# Patient Record
Sex: FEMALE | Race: WHITE | NOT HISPANIC OR LATINO | Employment: FULL TIME | URBAN - NONMETROPOLITAN AREA
[De-identification: names, ages, dates, MRNs, and addresses within clinical notes are randomized per-mention and may not be internally consistent; named-entity substitution may affect disease eponyms.]

---

## 2017-06-15 ENCOUNTER — OFFICE VISIT (OUTPATIENT)
Dept: VASCULAR SURGERY | Facility: CLINIC | Age: 67
End: 2017-06-15

## 2017-06-15 VITALS
OXYGEN SATURATION: 99 % | BODY MASS INDEX: 25.27 KG/M2 | HEART RATE: 75 BPM | SYSTOLIC BLOOD PRESSURE: 120 MMHG | WEIGHT: 161 LBS | DIASTOLIC BLOOD PRESSURE: 82 MMHG | HEIGHT: 67 IN

## 2017-06-15 DIAGNOSIS — M79.89 LEG SWELLING: ICD-10-CM

## 2017-06-15 DIAGNOSIS — I87.2 VENOUS (PERIPHERAL) INSUFFICIENCY: Primary | ICD-10-CM

## 2017-06-15 PROBLEM — I80.292: Status: ACTIVE | Noted: 2017-05-18

## 2017-06-15 PROCEDURE — 99203 OFFICE O/P NEW LOW 30 MIN: CPT | Performed by: NURSE PRACTITIONER

## 2017-06-15 RX ORDER — MULTIVIT WITH MINERALS/LUTEIN
1000 TABLET ORAL DAILY
COMMUNITY

## 2017-06-15 NOTE — PROGRESS NOTES
06/15/2017      JALYN Najera  [address] [facility] [place] [address]    Mesha Jacques  1950    Chief Complaint   Patient presents with   • Varicose Veins     Patient had Left lower extremity venous doppler done on 05/04/17 at [facility]       Dear JALYN Najera:      HPI  I had the pleasure of seeing your patient Mesha Jacques in the office today.  Thank you kindly for this consultation.  As you recall, Mesha Jacques is a 66 y.o.  female who you are currently following for routine health maintenance.  She states she does have swelling and heaviness to her legs.  She denies any claudicating symptoms to her lower extremities.  She denies any previous history of DVT.  She does have a history of varicose veins with recent thrombosed varicosity of her left lower extremity.      Past Medical History:   Diagnosis Date   • Breast cancer 1997    Dr Santos   • Thrombophlebitis of left lower extremity 05/18/2017   • Varicose vein of leg     Left anterior tibial area and in the upper shin area       Past Surgical History:   Procedure Laterality Date   • CHOLECYSTECTOMY     • LYMPH NODE DISSECTION Left 2006   • MASTECTOMY Left 2006       History reviewed. No pertinent family history.    Social History     Social History   • Marital status: Unknown     Spouse name: N/A   • Number of children: N/A   • Years of education: N/A     Occupational History   • Not on file.     Social History Main Topics   • Smoking status: Current Some Day Smoker     Years: 50.00     Types: Cigarettes   • Smokeless tobacco: Never Used   • Alcohol use No   • Drug use: Defer   • Sexual activity: Defer     Other Topics Concern   • Not on file     Social History Narrative       No Known Allergies    Prior to Admission medications    Medication Sig Start Date End Date Taking? Authorizing Provider   ascorbic acid (VITAMIN C) 1000 MG tablet Take 1,000 mg by mouth Daily.   Yes Historical Provider, MD   aspirin 81 MG EC tablet Take 81 mg by  "mouth Daily.   Yes Historical Provider, MD   Multiple Vitamins-Minerals (CENTRUM SILVER 50+WOMEN PO) Take  by mouth.   Yes Historical Provider, MD   vitamin E 1000 UNIT capsule Take 1,000 Units by mouth Daily.   Yes Historical Provider, MD   omeprazole (priLOSEC) 40 MG capsule Take 40 mg by mouth Daily.    Historical Provider, MD       Review of Systems   Constitutional: Negative.    HENT: Negative.    Eyes: Negative.    Respiratory: Negative.    Cardiovascular: Positive for leg swelling.   Gastrointestinal: Negative.    Endocrine: Negative.    Genitourinary: Negative.    Musculoskeletal: Negative.    Skin: Negative.    Allergic/Immunologic: Negative.    Neurological: Negative.    Hematological: Negative.    Psychiatric/Behavioral: Negative.        /82  Pulse 75  Ht 67\" (170.2 cm)  Wt 161 lb (73 kg)  SpO2 99%  BMI 25.22 kg/m2  Physical Exam   Constitutional: She is oriented to person, place, and time. She appears well-developed and well-nourished. No distress.   HENT:   Head: Normocephalic and atraumatic.   Mouth/Throat: Oropharynx is clear and moist.   Eyes: Pupils are equal, round, and reactive to light. No scleral icterus.   Neck: Normal range of motion. Neck supple. No JVD present. Carotid bruit is not present. No thyromegaly present.   Cardiovascular: Normal rate, regular rhythm, S2 normal, normal heart sounds, intact distal pulses and normal pulses.  Exam reveals no gallop and no friction rub.    No murmur heard.  Varicose veins to bilateral lower extremities   Pulmonary/Chest: Effort normal and breath sounds normal.   Abdominal: Soft. Normal aorta and bowel sounds are normal. There is no hepatosplenomegaly.   Musculoskeletal: Normal range of motion.   Neurological: She is alert and oriented to person, place, and time. No cranial nerve deficit.   Skin: Skin is warm and dry. She is not diaphoretic.   Psychiatric: She has a normal mood and affect. Her behavior is normal. Judgment and thought content " normal.   Nursing note and vitals reviewed.    Diagnostic Data:  Outside testing showed a thrombosed varicosity of left lower extremity vein    Patient Active Problem List   Diagnosis   • Varicose vein of leg   • Thrombophlebitis of left lower extremity         ICD-10-CM ICD-9-CM   1. Venous (peripheral) insufficiency I87.2 459.81   2. Leg swelling M79.89 729.81       Plan: After thoroughly evaluating Mesha Jacques, I believe the best course of action is to initially remain conservative from a vascular standpoint.  We will have her wear compression stockings over the next 3 months in the 20-30 mm pressure gradient range.  We will see her back in 3 months with a venous insufficiency study.  I did  extensively on smoking cessation, and the patient was advised of the continued risks of smoking.  I provided over 10 minutes counseling on this matter.  The patient can continue taking her current medication regimen as previously planned.  This was all discussed in full with complete understanding.    Thank you for allowing me to participate in the care of your patient.  Please do not hesitate with any questions or concerns.  I will keep you aware of any further encounters with Mesha Jacques.        Sincerely yours,         JALYN Marvin MD

## 2017-06-21 ENCOUNTER — HOSPITAL ENCOUNTER (OUTPATIENT)
Dept: GENERAL RADIOLOGY | Age: 67
Discharge: HOME | End: 2017-06-21
Payer: COMMERCIAL

## 2017-06-21 DIAGNOSIS — F17.200 TOBACCO USE DISORDER: ICD-10-CM

## 2017-06-21 DIAGNOSIS — J44.9 CHRONIC OBSTRUCTIVE PULMONARY DISEASE, UNSPECIFIED COPD TYPE (HCC): ICD-10-CM

## 2017-06-21 PROCEDURE — 71020 XR CHEST STANDARD TWO VW: CPT

## 2017-09-15 ENCOUNTER — HOSPITAL ENCOUNTER (OUTPATIENT)
Dept: ULTRASOUND IMAGING | Facility: HOSPITAL | Age: 67
Discharge: HOME | End: 2017-09-15
Admitting: NURSE PRACTITIONER

## 2017-09-15 ENCOUNTER — OFFICE VISIT (OUTPATIENT)
Dept: VASCULAR SURGERY | Facility: CLINIC | Age: 67
End: 2017-09-15

## 2017-09-15 VITALS
DIASTOLIC BLOOD PRESSURE: 74 MMHG | HEART RATE: 72 BPM | HEIGHT: 67 IN | SYSTOLIC BLOOD PRESSURE: 130 MMHG | BODY MASS INDEX: 24.01 KG/M2 | WEIGHT: 153 LBS

## 2017-09-15 DIAGNOSIS — I87.2 VENOUS (PERIPHERAL) INSUFFICIENCY: ICD-10-CM

## 2017-09-15 DIAGNOSIS — I87.2 VENOUS INSUFFICIENCY: ICD-10-CM

## 2017-09-15 DIAGNOSIS — M79.89 LEG SWELLING: ICD-10-CM

## 2017-09-15 DIAGNOSIS — I83.90 VARICOSE VEIN OF LEG: Primary | ICD-10-CM

## 2017-09-15 PROCEDURE — 93970 EXTREMITY STUDY: CPT | Performed by: SURGERY

## 2017-09-15 PROCEDURE — 93970 EXTREMITY STUDY: CPT

## 2017-09-15 PROCEDURE — 99213 OFFICE O/P EST LOW 20 MIN: CPT | Performed by: NURSE PRACTITIONER

## 2017-09-15 RX ORDER — BUPROPION HYDROCHLORIDE 150 MG/1
TABLET ORAL
Refills: 3 | COMMUNITY
Start: 2017-08-02

## 2017-09-15 NOTE — PROGRESS NOTES
"09/15/2017      Naomi Jo, JALYN  [address] [facility] [address]        Mesha Jacques  1950    Chief Complaint   Patient presents with   • Follow-up     Leg venous insufficiency study results.       Dear JALYN Cornell:    HPI     I had the pleasure of seeing you patient in the office today for follow up.  As you recall, the patient is a 67 y.o. female who has  swelling and heaviness to her legs.  She denies any claudicating symptoms to her lower extremities.  She denies any previous history of DVT.  She does have a history of varicose veins with recent thrombosed varicosity of her left lower extremity.  She has been wearing compression stockings, which have been helping with her symptoms immensely.  She did have noninvasive testing performed today, which I did review in office.      /74  Pulse 72  Ht 67\" (170.2 cm)  Wt 153 lb (69.4 kg)  BMI 23.96 kg/m2  Physical Exam  Constitutional: She is oriented to person, place, and time. She appears well-developed and well-nourished. No distress.   HENT:   Head: Normocephalic and atraumatic.   Mouth/Throat: Oropharynx is clear and moist.   Eyes: Pupils are equal, round, and reactive to light. No scleral icterus.   Neck: Normal range of motion. Neck supple. No JVD present. Carotid bruit is not present. No thyromegaly present.   Cardiovascular: Normal rate, regular rhythm, S2 normal, normal heart sounds, intact distal pulses and normal pulses.  Exam reveals no gallop and no friction rub.    No murmur heard.  Varicose veins to bilateral lower extremities   Pulmonary/Chest: Effort normal and breath sounds normal.   Abdominal: Soft. Normal aorta and bowel sounds are normal. There is no hepatosplenomegaly.   Musculoskeletal: Normal range of motion.   Neurological: She is alert and oriented to person, place, and time. No cranial nerve deficit.   Skin: Skin is warm and dry. She is not diaphoretic.   Psychiatric: She has a normal mood and affect. Her " behavior is normal. Judgment and thought content normal.   Nursing note and vitals reviewed.    DIAGNOSTIC DATA: Noninvasive testing including a venous valvular insufficiency study shows greater than 0.5 seconds of reflux to the right leg in all areas except mid thigh with greater than 0.5 seconds of lesser saphenous vein us reflux.  In the left lower extremity she does have significant venous reflux noted in the SF J mid thigh and above the knee with greater than 0.9 seconds of reflux in the lesser saphenous vein mid calf only.      Patient Active Problem List   Diagnosis   • Varicose vein of leg   • Thrombophlebitis of left lower extremity         ICD-10-CM ICD-9-CM   1. Varicose vein of leg I83.90 454.9   2. Venous insufficiency I87.2 459.81   3. Leg swelling M79.89 729.81         PLAN: After thoroughly evaluating Mesha Jacques, I believe the best course of action is to Remain conservative from vascular standpoint.  Currently she has been wearing the stockings and states this is helping with her swelling and heaviness and tiredness of present extremities.  I did offer radiofrequency ablation and she would like to continue wearing the stockings at this time.  We will see her back in 1 year for continued surveillance.  The patient is to continue taking their medications as previously discussed.   This was all discussed in full with complete understanding.  Thank you for allowing me to participate in the care of your patient.  Please do not hesitate to call with any questions or concerns.  We will keep you aware of any further encounters with Mesha Jacques.      Sincerely Yours,      JALYN Marvin

## 2018-09-17 ENCOUNTER — TELEPHONE (OUTPATIENT)
Dept: VASCULAR SURGERY | Facility: CLINIC | Age: 68
End: 2018-09-17

## 2018-09-17 NOTE — TELEPHONE ENCOUNTER
Left message reminding Mrs Jacques of her appointment for Tuesday, September 18th, 2018 at 930 am with Priscilla GUNDERSON. Also advised if she had any questions or needed to reschedule to please call the office at [phone].

## 2019-07-16 VITALS
DIASTOLIC BLOOD PRESSURE: 66 MMHG | HEART RATE: 74 BPM | SYSTOLIC BLOOD PRESSURE: 122 MMHG | HEIGHT: 67 IN | WEIGHT: 144 LBS | BODY MASS INDEX: 22.6 KG/M2

## 2019-07-18 VITALS
OXYGEN SATURATION: 95 % | HEART RATE: 74 BPM | SYSTOLIC BLOOD PRESSURE: 122 MMHG | DIASTOLIC BLOOD PRESSURE: 66 MMHG | HEIGHT: 67 IN | BODY MASS INDEX: 22.6 KG/M2 | WEIGHT: 144 LBS

## 2019-07-18 DIAGNOSIS — N60.12 FIBROCYSTIC DISEASE OF LEFT BREAST: ICD-10-CM

## 2019-07-18 DIAGNOSIS — J44.9 CHRONIC OBSTRUCTIVE PULMONARY DISEASE, UNSPECIFIED COPD TYPE (HCC): ICD-10-CM

## 2019-07-18 DIAGNOSIS — Z85.3 PERSONAL HISTORY OF MALIGNANT PHYLLOIDES TUMOR OF BREAST: ICD-10-CM

## 2019-07-18 PROBLEM — N60.19 FIBROCYSTIC DISEASE OF BREAST: Status: ACTIVE | Noted: 2019-07-18

## 2019-07-18 SDOH — HEALTH STABILITY: MENTAL HEALTH: HOW OFTEN DO YOU HAVE A DRINK CONTAINING ALCOHOL?: NEVER

## 2019-08-07 ENCOUNTER — HOSPITAL ENCOUNTER (OUTPATIENT)
Dept: INFUSION THERAPY | Age: 69
Discharge: HOME | End: 2019-08-07
Payer: COMMERCIAL

## 2019-08-07 DIAGNOSIS — Z85.3 PERSONAL HISTORY OF MALIGNANT PHYLLOIDES TUMOR OF BREAST: Primary | ICD-10-CM

## 2019-08-07 DIAGNOSIS — N60.12 FIBROCYSTIC DISEASE OF LEFT BREAST: ICD-10-CM

## 2019-08-07 DIAGNOSIS — Z85.3 PERSONAL HISTORY OF MALIGNANT PHYLLOIDES TUMOR OF BREAST: ICD-10-CM

## 2019-08-07 DIAGNOSIS — J44.9 CHRONIC OBSTRUCTIVE PULMONARY DISEASE, UNSPECIFIED COPD TYPE (HCC): ICD-10-CM

## 2019-08-07 PROCEDURE — 36415 COLL VENOUS BLD VENIPUNCTURE: CPT

## 2019-08-07 PROCEDURE — 82378 CARCINOEMBRYONIC ANTIGEN: CPT

## 2019-08-07 PROCEDURE — 80053 COMPREHEN METABOLIC PANEL: CPT

## 2019-08-07 PROCEDURE — 86300 IMMUNOASSAY TUMOR CA 15-3: CPT

## 2019-08-07 PROCEDURE — 85025 COMPLETE CBC W/AUTO DIFF WBC: CPT

## 2019-08-27 NOTE — PROGRESS NOTES
radiation therapy to the left breast consisted of 5,040cGy followed by a 1,000cGy boost for a total of 6,040cGy completed on 07   6. Adjuvant endocrine therapy with aromatase inhibitor Arimidex was started in 2007 and completed in 2012. Allergies:  Patient has no known allergies. Medicines:  No current outpatient medications on file. No current facility-administered medications for this visit. Past Medical History:      Diagnosis Date    BMI 22.0-22.9, adult     COPD (chronic obstructive pulmonary disease) (HCC)     Estrogen receptor positive     Fibrocystic breast     Hypercholesterolemia     Hypertriglyceridemia     Nicotine dependence     Personal history of malignant neoplasm of breast     Personal history of malignant phylloides tumor of breast         Past Surgical History:      Procedure Laterality Date    BREAST LUMPECTOMY Left     and axillary dissection    CHOLECYSTECTOMY  2006    Dr Tray Christie        Family History:      Problem Relation Age of Onset    Breast Cancer Neg Hx         Social History  Social History     Tobacco Use    Smoking status: Former Smoker     Last attempt to quit: 2018     Years since quittin.6    Smokeless tobacco: Never Used   Substance Use Topics    Alcohol use: Never     Frequency: Never    Drug use: Never          Review of Systems:  Constitutional: Negative for chills, fatigue, fever or significant weight loss. HENT: Negative for congestion, hearing loss, nosebleeds or sore throat. Eyes: Negative for photophobia, pain, discharge, redness and visual disturbance. Respiratory: Negative for cough, shortness of breath, or wheezing. Cardiovascular: Negative for chest pain, palpitations or leg swelling. Gastrointestinal: Negative for abdominal pain, blood in stool, constipation, diarrhea, nausea or vomiting. Genitourinary: Negative for dysuria, flank pain, frequency, hematuria or urgency.    Musculoskeletal: Negative for back cough.    Tumor markers with  CA 15-3 and CEA were both normal    CBC today is stable    Screening chest CT and bilateral mammograms were done prior to today's visit at [facility].  We have not been able to retrieve these. Results have been requested. Plans:  1. Conservative monitoring will continue. 2. Follow-up 6 months, sooner if needed with lab work. Chacho Alonzo am scribing for Lorenzo Lizarraga MD. Electronically signed by Makayla Morgan LPN on 3/45/2946 at 1:24 PM       Jazlyn Loyola was seen today for other. Diagnoses and all orders for this visit:    Personal history of malignant phylloides tumor of breast  -     CBC Auto Differential; Future  -     Comprehensive Metabolic Panel; Future  -     CEA; Future  -     Cancer Antigen 15-3; Future    Chronic obstructive pulmonary disease, unspecified COPD type ([facility] [address]  -     CBC Auto Differential; Future    Fibrocystic disease of left breast  -     CBC Auto Differential; Future  -     Comprehensive Metabolic Panel; Future  -     CEA; Future  -     Cancer Antigen 15-3; Future        Return in about 6 months (around 2/28/2020) for follow-up w/ Dr. Dion Grewal. I, Dr. Radha Quijano, personally performed the services described in this documentation as scribed by Makayla Morgan LPN in my presence, and it is both accurate and complete.

## 2019-08-28 ENCOUNTER — HOSPITAL ENCOUNTER (OUTPATIENT)
Dept: INFUSION THERAPY | Age: 69
Discharge: HOME | End: 2019-08-28
Payer: COMMERCIAL

## 2019-08-28 ENCOUNTER — OFFICE VISIT (OUTPATIENT)
Dept: HEMATOLOGY | Age: 69
End: 2019-08-28
Payer: COMMERCIAL

## 2019-08-28 VITALS
HEIGHT: 67 IN | WEIGHT: 143.3 LBS | OXYGEN SATURATION: 96 % | BODY MASS INDEX: 22.49 KG/M2 | SYSTOLIC BLOOD PRESSURE: 110 MMHG | DIASTOLIC BLOOD PRESSURE: 70 MMHG | HEART RATE: 70 BPM

## 2019-08-28 DIAGNOSIS — N60.12 FIBROCYSTIC DISEASE OF LEFT BREAST: ICD-10-CM

## 2019-08-28 DIAGNOSIS — J44.9 CHRONIC OBSTRUCTIVE PULMONARY DISEASE, UNSPECIFIED COPD TYPE (HCC): ICD-10-CM

## 2019-08-28 DIAGNOSIS — Z85.3 PERSONAL HISTORY OF MALIGNANT PHYLLOIDES TUMOR OF BREAST: ICD-10-CM

## 2019-08-28 DIAGNOSIS — Z85.3 PERSONAL HISTORY OF MALIGNANT PHYLLOIDES TUMOR OF BREAST: Primary | ICD-10-CM

## 2019-08-28 PROCEDURE — 85025 COMPLETE CBC W/AUTO DIFF WBC: CPT

## 2019-08-28 PROCEDURE — 99214 OFFICE O/P EST MOD 30 MIN: CPT | Performed by: INTERNAL MEDICINE

## 2020-01-28 ENCOUNTER — HOSPITAL ENCOUNTER (OUTPATIENT)
Dept: INFUSION THERAPY | Age: 70
Discharge: HOME | End: 2020-01-28
Payer: COMMERCIAL

## 2020-01-28 DIAGNOSIS — Z85.3 PERSONAL HISTORY OF MALIGNANT PHYLLOIDES TUMOR OF BREAST: ICD-10-CM

## 2020-01-28 DIAGNOSIS — N60.12 FIBROCYSTIC DISEASE OF LEFT BREAST: ICD-10-CM

## 2020-01-28 PROCEDURE — 85025 COMPLETE CBC W/AUTO DIFF WBC: CPT

## 2020-02-26 NOTE — PROGRESS NOTES
Patient:  Breezy Spear  YOB: 1950  Date of Service: 2/27/2020  MRN: [id number]    Primary Care Physician: Sharmin Velarde MD    Chief Complaint   Patient presents with    Breast Cancer       Patient Seen, Chart, Consults notes, Labs, Radiology studies reviewed. Subjective:  Diamond Ruelas is a pleasant 38-year-old  female managed with primary and secondary diagnoses as outlined:    · Left lumpectomy for a Stage I (T1 N0 M0) infiltrating breast cancer, 12/19/2006  · Lazara quit smoking on 10/1/2018.         She continues working as a [employer] 3 days a week.     TARGET BREAST CANCER SITES:  1. Left lumpectomy 12/19/2006.     TUMOR HISTORY: Left stage I (T1 N0 M0) infiltrating breast cancer, 12/19/2006  On 12/19/2006 Lazara underwent a left lumpectomy and axillary dissection. Pathology revealed a 2cm grade III, ER positive, WA negative, [id number] 3+ positive breast cancer. The axillary lymph node dissection was negative.      She received 4 courses of adjuvant Adriamycin/Cytoxan followed by 4 courses of Taxotere. Diamond Ruelas also received adjuvant Herceptin that was completed on 04/21/08.      Adjuvant radiation therapy to the left breast consisted of 5,040cGy followed by a 1,000cGy boost for a total of 6,040cGy completed on 9/27/07.       Lazara received adjuvant endocrine therapy with aromatase inhibitor Arimidex that was started in July 2007. A 5 year course of Arimidex was completed in July 2012.      TREATMENT SUMMARY:  1. Left lumpectomy 12/19/2006   2. Adriamycin and Cytoxan x 4.   3. Taxotere x 4.   4. Herceptin. 5. Adjuvant radiation therapy to the left breast consisted of 5,040cGy followed by a 1,000cGy boost for a total of 6,040cGy completed on 9/27/07   6.  Adjuvant endocrine therapy with aromatase inhibitor Arimidex was started in 7/2007 and completed in 7/2012.          Allergies:  No known allergies    Medicines:  Current Outpatient Medications   Medication Sig Dispense Refill    MULTIPLE MINERALS-VITAMINS PO Take by mouth      Ascorbic Acid (VITAMIN C) 1000 MG tablet Take 1,000 mg by mouth      aspirin EC 81 MG EC tablet Take 81 mg by mouth      buPROPion (WELLBUTRIN XL) 150 MG extended release tablet Wellbutrin  mg 24 hr tablet, extended release   Take 1 tablet every day by oral route.  buPROPion (WELLBUTRIN XL) 150 MG extended release tablet       omeprazole (PRILOSEC) 40 MG delayed release capsule Take 40 mg by mouth      vitamin E 1000 units capsule Take 1,000 Units by mouth       No current facility-administered medications for this visit. Past Medical History:      Diagnosis Date    BMI 22.0-22.9, adult     COPD (chronic obstructive pulmonary disease) (HCC)     Estrogen receptor positive     Fibrocystic breast     Hypercholesterolemia     Hypertriglyceridemia     Nicotine dependence     Personal history of malignant neoplasm of breast     Personal history of malignant phylloides tumor of breast         Past Surgical History:      Procedure Laterality Date    BREAST LUMPECTOMY Left     and axillary dissection    CHOLECYSTECTOMY  2006    Dr Luis Villatoro        Family History:      Problem Relation Age of Onset    Breast Cancer Neg Hx         Social History  Social History     Tobacco Use    Smoking status: Former Smoker     Last attempt to quit: 2018     Years since quittin.1    Smokeless tobacco: Never Used   Substance Use Topics    Alcohol use: Never     Frequency: Never    Drug use: Never          Review of Systems:  Constitutional: Negative for chills, fatigue, fever or significant weight loss. HENT: Negative for congestion, hearing loss, nosebleeds or sore throat. Eyes: Negative for photophobia, pain, discharge, redness and visual disturbance. Respiratory: Negative for cough, shortness of breath, or wheezing. Cardiovascular: Negative for chest pain, palpitations or leg swelling.    Gastrointestinal: Negative for abdominal pain, blood in stool, constipation, diarrhea, nausea or vomiting. Genitourinary: Negative for dysuria, flank pain, frequency, hematuria or urgency. Musculoskeletal: Negative for back pain, joint swelling, myalgias or neck pain. Skin: Negative for rash or petechiae. Neurological: Negative for tremors, seizures, syncope, weakness or headaches. Hematological: No active bruising or bleeding. Psychiatric/Behavioral: Negative for hallucinations. Objective:  Vital Signs: Blood pressure 120/76, pulse 71, height 5' 7\" (1.702 m), weight 146 lb 1.6 oz (66.3 kg), SpO2 97 %. Physical Exam   Constitutional: Oriented to person, place, and time. No acute distress. Head: Normocephalic and atraumatic. Nose: Nose normal.   Mouth/Throat: Oropharynx is clear and moist. No oropharyngeal exudate. Eyes: Pupils are equal and round. Conjunctivae and EOM are normal. No scleral icterus. Neck: Normal range of motion. Neck supple. No JVD. No appreciable thyromegaly. Cardiovascular: Normal rate, regular rhythm, normal heart sounds and intact distal pulses. Exam reveals no gallop, murmurs or friction rub. Pulmonary/Chest: Effort normal and breath sounds normal. No respiratory distress. No wheezes. Abdominal: Soft. Bowel sounds are normal. No organomegally or masses. No tenderness. There is no rebound and no guarding. Musculoskeletal: Normal range of motion. No edema or tenderness. Lymphadenopathy: No cervical, axillary or inguinal lymphadenopathy. Neurological: Alert and oriented to person, place, and time. Cranial nerves are intact. Neurological exam is nonfocal  Skin: Skin is warm and dry. No rash noted. No erythema. No pallor. Psychiatric: Judgment normal.     Labs:  BMP: No results for input(s): NA, K, CL, CO2, PHOS, BUN, CREATININE, CALCIUM in the last 72 hours. CBC: No results for input(s): WBC, HGB, HCT, MCV, PLT in the last 72 hours.   LIVER PROFILE: No results for input(s): AST, ALT, LIPASE, BILIDIR, BILITOT, ALKPHOS in the last 72 hours. Invalid input(s): AMYLASE,  ALB  PT/INR: No results for input(s): PROTIME, INR in the last 72 hours. APTT: No results for input(s): APTT in the last 72 hours. BNP:  No results for input(s): BNP in the last 72 hours. Ionized Calcium:No results for input(s): IONCA in the last 72 hours. Magnesium:No results for input(s): MG in the last 72 hours. Phosphorus:No results for input(s): PHOS in the last 72 hours. HgbA1C: No results for input(s): LABA1C in the last 72 hours. Hepatic: No results for input(s): ALKPHOS, ALT, AST, PROT, BILITOT, BILIDIR, LABALBU in the last 72 hours. Lactic Acid: No results for input(s): LACTA in the last 72 hours. Troponin: No results for input(s): CKTOTAL, CKMB, TROPONINT in the last 72 hours. ABGs: No results for input(s): PH, PCO2, PO2, HCO3, O2SAT in the last 72 hours. CRP:  No results for input(s): CRP in the last 72 hours. Sed Rate:  No results for input(s): SEDRATE in the last 72 hours. Cultures:   No results for input(s): CULTURE in the last 72 hours. Radiology reports as per the Radiologist  Radiology: No results found. ASSESSMENT AND PLAN:  #1 Phu Cisneros is a pleasant 64-year-old  female managed with primary and secondary diagnoses as outlined:    · Left lumpectomy for a Stage I (T1 N0 M0) infiltrating breast cancer, 12/19/2006  · Lazara quit smoking on 10/1/2018. She continues working as a [employer] 3 days a week. Bilateral breast examination is performed and without new findings. The left nipple is chronically inverted and fibrocystic disease to the inner medial right breast adjacent to the nipple is unchanged. There are no cervical, supra/infraclavicular or axillary adenopathies. The abdomen is benign.  Lungs are clear though Phu Cisneros does have a dry cough.     Tumor markers with  CA 15-3 and CEA were both previously normal, and will be repeated today     CBC today is stable with a WBC of 7.35, hemoglobin 13.1 and a platelet count of 239,000    CT chest 7/22/2019. · Did not reveal evidence of hilar or mediastinal adenopathy. · Mild chronic interstitial changes with no areas of acute consolidation  · No pleural effusions nor discrete parenchymal nodules    Bilateral mammograms on 7/22/2019 at [facility] was a BI-RADS Category 1 with a one-year follow-up recommended       Plans:  1. Conservative monitoring will continue. 2. Follow-up 6 months, sooner if needed with lab work.       IAlondra LPN am scribing for Tracy Santos MD. Electronically signed by Dayna Frances LPN on 4/40/2623 at 6:18 PM       Phu Cisneros was seen today for breast cancer. Diagnoses and all orders for this visit:    Personal history of malignant phylloides tumor of breast  -     Comprehensive Metabolic Panel; Future  -     CEA; Future  -     Cancer Antigen 15-3; Future  -     MARSHALL DIGITAL SCREEN W CAD BILATERAL; Future    Encounter for screening mammogram for malignant neoplasm of breast  -     MARSHALL DIGITAL SCREEN W CAD BILATERAL; Future        Orders Placed This Encounter   Procedures    MARSHALL DIGITAL SCREEN W CAD BILATERAL     Standing Status:   Future     Standing Expiration Date:   2/27/2021     Order Specific Question:   Reason for exam:     Answer: Hx breast cancer    Comprehensive Metabolic Panel     Standing Status:   Future     Number of Occurrences:   1     Standing Expiration Date:   2/27/2021    CEA     Standing Status:   Future     Number of Occurrences:   1     Standing Expiration Date:   2/27/2021    Cancer Antigen 15-3     Standing Status:   Future     Number of Occurrences:   1     Standing Expiration Date:   2/27/2021       No orders of the defined types were placed in this encounter. Return in about 6 months (around 8/27/2020) for follow-up w/ Dr. Omar Durand.          I, Dr. Anne Perez, personally performed the services described in this documentation as scribed by Dayna Frances LPN in my presence, and it is both accurate and complete.

## 2020-02-27 ENCOUNTER — HOSPITAL ENCOUNTER (OUTPATIENT)
Dept: INFUSION THERAPY | Age: 70
Discharge: HOME | End: 2020-02-27
Payer: COMMERCIAL

## 2020-02-27 ENCOUNTER — OFFICE VISIT (OUTPATIENT)
Dept: HEMATOLOGY | Age: 70
End: 2020-02-27
Payer: COMMERCIAL

## 2020-02-27 VITALS
HEIGHT: 67 IN | DIASTOLIC BLOOD PRESSURE: 76 MMHG | WEIGHT: 146.1 LBS | OXYGEN SATURATION: 97 % | SYSTOLIC BLOOD PRESSURE: 120 MMHG | HEART RATE: 71 BPM | BODY MASS INDEX: 22.93 KG/M2

## 2020-02-27 DIAGNOSIS — Z85.3 PERSONAL HISTORY OF MALIGNANT PHYLLOIDES TUMOR OF BREAST: ICD-10-CM

## 2020-02-27 PROCEDURE — 3017F COLORECTAL CA SCREEN DOC REV: CPT | Performed by: INTERNAL MEDICINE

## 2020-02-27 PROCEDURE — 82378 CARCINOEMBRYONIC ANTIGEN: CPT

## 2020-02-27 PROCEDURE — 86300 IMMUNOASSAY TUMOR CA 15-3: CPT

## 2020-02-27 PROCEDURE — G8400 PT W/DXA NO RESULTS DOC: HCPCS | Performed by: INTERNAL MEDICINE

## 2020-02-27 PROCEDURE — G8427 DOCREV CUR MEDS BY ELIG CLIN: HCPCS | Performed by: INTERNAL MEDICINE

## 2020-02-27 PROCEDURE — 99214 OFFICE O/P EST MOD 30 MIN: CPT | Performed by: INTERNAL MEDICINE

## 2020-02-27 PROCEDURE — 80053 COMPREHEN METABOLIC PANEL: CPT

## 2020-02-27 PROCEDURE — 4040F PNEUMOC VAC/ADMIN/RCVD: CPT | Performed by: INTERNAL MEDICINE

## 2020-02-27 PROCEDURE — 1123F ACP DISCUSS/DSCN MKR DOCD: CPT | Performed by: INTERNAL MEDICINE

## 2020-02-27 PROCEDURE — 99212 OFFICE O/P EST SF 10 MIN: CPT

## 2020-02-27 PROCEDURE — 1090F PRES/ABSN URINE INCON ASSESS: CPT | Performed by: INTERNAL MEDICINE

## 2020-02-27 PROCEDURE — 1036F TOBACCO NON-USER: CPT | Performed by: INTERNAL MEDICINE

## 2020-02-27 PROCEDURE — 85025 COMPLETE CBC W/AUTO DIFF WBC: CPT

## 2020-02-27 PROCEDURE — G8420 CALC BMI NORM PARAMETERS: HCPCS | Performed by: INTERNAL MEDICINE

## 2020-02-27 PROCEDURE — 36415 COLL VENOUS BLD VENIPUNCTURE: CPT

## 2020-02-27 PROCEDURE — G8484 FLU IMMUNIZE NO ADMIN: HCPCS | Performed by: INTERNAL MEDICINE

## 2020-02-27 RX ORDER — ASPIRIN 81 MG/1
81 TABLET ORAL
COMMUNITY

## 2020-02-27 RX ORDER — MULTIVIT WITH MINERALS/LUTEIN
1000 TABLET ORAL
COMMUNITY

## 2020-02-27 RX ORDER — BUPROPION HYDROCHLORIDE 150 MG/1
TABLET ORAL
COMMUNITY
Start: 2017-08-02 | End: 2020-09-08 | Stop reason: ALTCHOICE

## 2020-02-27 RX ORDER — OMEPRAZOLE 40 MG/1
40 CAPSULE, DELAYED RELEASE ORAL
COMMUNITY
End: 2020-09-08 | Stop reason: ALTCHOICE

## 2020-02-27 RX ORDER — BUPROPION HYDROCHLORIDE 150 MG/1
TABLET ORAL
COMMUNITY
Start: 2019-12-06 | End: 2020-09-08 | Stop reason: ALTCHOICE

## 2020-08-26 NOTE — PROGRESS NOTES
Patient:  Arline Steiner  YOB: 1950  Date of Service: 9/8/2020  MRN: [id number]    Primary Care Physician: Nia Poon MD    Chief Complaint   Patient presents with    Follow-up     Personal history of malignant phylloides tumor of breast         Patient Seen, Chart, Consults notes, Labs, Radiology studies reviewed. Subjective:  Collette Foley is a pleasant 40-year-old  female managed with primary and secondary diagnoses as outlined:  · Left lumpectomy for a Stage I (T1 N0 M0) infiltrating breast cancer, 12/19/2006  · Lazara quit smoking on 10/1/2018. 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     TARGET BREAST CANCER SITES:  1. Left lumpectomy 12/19/2006.     TUMOR HISTORY: Left stage I (T1 N0 M0) infiltrating breast cancer, 12/19/2006  On 12/19/2006 Lazara underwent a left lumpectomy and axillary dissection. Pathology revealed a 2cm grade III, ER positive, HI negative, mad5sol 3+ positive breast cancer. The axillary lymph node dissection was negative.      She received 4 courses of adjuvant Adriamycin/Cytoxan followed by 4 courses of Taxotere. Collette Foley also received adjuvant Herceptin that was completed on 04/21/08.      Adjuvant radiation therapy to the left breast consisted of 5,040cGy followed by a 1,000cGy boost for a total of 6,040cGy completed on 9/27/07.       Lazara received adjuvant endocrine therapy with aromatase inhibitor Arimidex that was started in July 2007. A 5 year course of Arimidex was completed in July 2012.      TREATMENT SUMMARY:  1. Left lumpectomy 12/19/2006   2. Adriamycin and Cytoxan x 4.   3. Taxotere x 4.   4. Herceptin. 5. Adjuvant radiation therapy to the left breast consisted of 5,040cGy followed by a 1,000cGy boost for a total of 6,040cGy completed on 9/27/07   6.  Adjuvant endocrine therapy with aromatase inhibitor Arimidex was started in 7/2007 and completed in 7/2012.          Allergies:  No known allergies    Medicines:  Current Outpatient Medications   Medication Sig Dispense Refill    MULTIPLE MINERALS-VITAMINS PO Take by mouth      Ascorbic Acid (VITAMIN C) 1000 MG tablet Take 1,000 mg by mouth      aspirin EC 81 MG EC tablet Take 81 mg by mouth      vitamin E 1000 units capsule Take 1,000 Units by mouth       No current facility-administered medications for this visit. Past Medical History:      Diagnosis Date    BMI 22.0-22.9, adult     COPD (chronic obstructive pulmonary disease) (HCC)     Estrogen receptor positive     Fibrocystic breast     Hypercholesterolemia     Hypertriglyceridemia     Nicotine dependence     Personal history of malignant neoplasm of breast     Personal history of malignant phylloides tumor of breast         Past Surgical History:      Procedure Laterality Date    BREAST LUMPECTOMY Left     and axillary dissection    CHOLECYSTECTOMY  2006    Dr Roxi Walter        Family History:      Problem Relation Age of Onset    Breast Cancer Neg Hx         Social History  Social History     Tobacco Use    Smoking status: Former Smoker     Last attempt to quit: 2018     Years since quittin.6    Smokeless tobacco: Never Used   Substance Use Topics    Alcohol use: Never     Frequency: Never    Drug use: Never          Review of Systems:  Constitutional: Negative for chills, fatigue, fever or significant weight loss. HENT: Negative for congestion, hearing loss, nosebleeds or sore throat. Eyes: Negative for photophobia, pain, discharge, redness and visual disturbance. Respiratory: Negative for cough, shortness of breath, or wheezing. Cardiovascular: Negative for chest pain, palpitations or leg swelling. Gastrointestinal: Negative for abdominal pain, blood in stool, constipation, diarrhea, nausea or vomiting. Genitourinary: Negative for dysuria, flank pain, frequency, hematuria or urgency.    Musculoskeletal: Negative for back pain, joint swelling, myalgias or neck pain. Skin: Negative for rash or petechiae. Neurological: Negative for tremors, seizures, syncope, weakness or headaches. Hematological: No active bruising or bleeding. Psychiatric/Behavioral: Negative for hallucinations. Objective:  Vital Signs: Blood pressure 112/60, pulse 71, temperature 97.5 °F (36.4 °C), height 5' 7\" (1.702 m), weight 149 lb 14.4 oz (68 kg), SpO2 94 %. Physical Exam   Constitutional: Oriented to person, place, and time. No acute distress. Head: Normocephalic and atraumatic. Nose: Nose normal.   Mouth/Throat: Oropharynx is clear and moist. No oropharyngeal exudate. Eyes: Pupils are equal and round. Conjunctivae and EOM are normal. No scleral icterus. Neck: Normal range of motion. Neck supple. No JVD. No appreciable thyromegaly. Cardiovascular: Normal rate, regular rhythm, normal heart sounds and intact distal pulses. Exam reveals no gallop, murmurs or friction rub. Pulmonary/Chest: Effort normal and breath sounds normal. No respiratory distress. No wheezes. Abdominal: Soft. Bowel sounds are normal. No organomegally or masses. No tenderness. There is no rebound and no guarding. Musculoskeletal: Normal range of motion. No edema or tenderness. Lymphadenopathy: No cervical, axillary or inguinal lymphadenopathy. Neurological: Alert and oriented to person, place, and time. Cranial nerves are intact. Neurological exam is nonfocal  Skin: Skin is warm and dry. No rash noted. No erythema. No pallor. Psychiatric: Judgment normal.     Labs:  BMP: No results for input(s): NA, K, CL, CO2, PHOS, BUN, CREATININE, CALCIUM in the last 72 hours. CBC:   Recent Labs     09/08/20  0946   WBC 7.33   HGB 14.1   HCT 44.8   MCV 99.1*        LIVER PROFILE: No results for input(s): AST, ALT, LIPASE, BILIDIR, BILITOT, ALKPHOS in the last 72 hours. Invalid input(s): AMYLASE,  ALB  PT/INR: No results for input(s): PROTIME, INR in the last 72 hours.   APTT: No 247,000.      Tumor markers with  CA 15-3 and CEA were both previously normal, and will be repeated today    Serology on 2/27/2020 revealed:  CMP- WNL  CEA- 0.6  CA 15.3- 24.3    CT chest on 7/22/2019 documented:   · No evidence of hilar or mediastinal adenopathy. · Mild chronic interstitial changes with no areas of acute consolidation  · No pleural effusions nor discrete parenchymal nodules    Bilateral mammograms on 7/29/2020 at [facility] was a BI-RADS Category 1 with a one-year follow-up recommended. #2  Breast cancer screening    Bilateral mammograms on 7/29/2020 at [facility] was a BI-RADS Category 1 with a one-year follow-up recommended. Bilateral breast examination is performed and without new findings    #3  GI cancer screening    Colonoscopy performed on 5/28/2019 by Dr. Samantha Mora at [facility] showed moderate diverticulosis throughout the descending and sigmoid colons, otherwise normal.   Pathology negative.       #4  GYN cancer screening    Ms. Barney will be seeing MAGNUS Borjas in [facility] as PCP and GYN monitoring. She admits it has been a while since she had a Pap smear. She will get this taken care of. Plans:  1. Conservative monitoring will continue. 2. Follow-up 6 months, sooner if needed with lab work.       I, Alondra Warner LPN am scribing for Blayne Chen MD. Electronically signed by Александр Castro LPN on 0/2/8589 at 91:08 AM       Katiuska Couch was seen today for follow-up. Diagnoses and all orders for this visit:    Personal history of malignant phylloides tumor of breast  -     Comprehensive Metabolic Panel; Future  -     CEA; Future  -     Cancer Antigen 15-3;  Future    Health care maintenance        Orders Placed This Encounter   Procedures    Comprehensive Metabolic Panel     Standing Status:   Future     Standing Expiration Date:   9/8/2021    CEA     Standing Status:   Future     Standing Expiration Date: 9/8/2021    Cancer Antigen 15-3     Standing Status:   Future     Standing Expiration Date:   9/8/2021       No orders of the defined types were placed in this encounter. Return in about 6 months (around 3/8/2021) for follow-up w/ Dr. Lora Drake. I, Dr. Sirena Bucio, personally performed the services described in this documentation as scrib[facility] by [facility] in my presence, and it is both accurate and complete.

## 2020-09-08 ENCOUNTER — OFFICE VISIT (OUTPATIENT)
Dept: HEMATOLOGY | Age: 70
End: 2020-09-08
Payer: COMMERCIAL

## 2020-09-08 ENCOUNTER — HOSPITAL ENCOUNTER (OUTPATIENT)
Dept: INFUSION THERAPY | Age: 70
Discharge: HOME | End: 2020-09-08
Payer: COMMERCIAL

## 2020-09-08 VITALS
BODY MASS INDEX: 23.53 KG/M2 | SYSTOLIC BLOOD PRESSURE: 112 MMHG | WEIGHT: 149.9 LBS | TEMPERATURE: 97.5 F | HEART RATE: 71 BPM | DIASTOLIC BLOOD PRESSURE: 60 MMHG | HEIGHT: 67 IN | OXYGEN SATURATION: 94 %

## 2020-09-08 DIAGNOSIS — Z85.3 PERSONAL HISTORY OF MALIGNANT PHYLLOIDES TUMOR OF BREAST: ICD-10-CM

## 2020-09-08 PROBLEM — Z00.00 HEALTH CARE MAINTENANCE: Status: ACTIVE | Noted: 2020-09-08

## 2020-09-08 LAB
ALBUMIN SERPL-MCNC: 4.8 G/DL (ref 3.5–5.2)
ALP BLD-CCNC: 93 U/L (ref 35–104)
ALT SERPL-CCNC: 24 U/L (ref 9–52)
ANION GAP SERPL CALCULATED.3IONS-SCNC: 9 MMOL/L (ref 7–19)
AST SERPL-CCNC: 35 U/L (ref 14–36)
BASOPHILS ABSOLUTE: 0.05 K/UL (ref 0.01–0.08)
BASOPHILS RELATIVE PERCENT: 0.7 % (ref 0.1–1.2)
BILIRUB SERPL-MCNC: 0.8 MG/DL (ref 0.2–1.3)
BUN BLDV-MCNC: 25 MG/DL (ref 7–17)
CA 15-3: 25.9 U/ML (ref 0–35)
CALCIUM SERPL-MCNC: 9.7 MG/DL (ref 8.4–10.2)
CEA: 0.7 NG/ML (ref 0–3)
CHLORIDE BLD-SCNC: 103 MMOL/L (ref 98–111)
CO2: 29 MMOL/L (ref 22–29)
CREAT SERPL-MCNC: 0.7 MG/DL (ref 0.5–1)
EOSINOPHILS ABSOLUTE: 0.15 K/UL (ref 0.04–0.54)
EOSINOPHILS RELATIVE PERCENT: 2 % (ref 0.7–7)
GFR NON-AFRICAN AMERICAN: >60
GLOBULIN: 2.7 G/DL
GLUCOSE BLD-MCNC: 88 MG/DL (ref 74–106)
HCT VFR BLD CALC: 44.8 % (ref 34.1–44.9)
HEMOGLOBIN: 14.1 G/DL (ref 11.2–15.7)
LYMPHOCYTES ABSOLUTE: 2.41 K/UL (ref 1.18–3.74)
LYMPHOCYTES RELATIVE PERCENT: 32.9 % (ref 19.3–53.1)
MCH RBC QN AUTO: 31.2 PG (ref 25.6–32.2)
MCHC RBC AUTO-ENTMCNC: 31.5 G/DL (ref 32.3–35.5)
MCV RBC AUTO: 99.1 FL (ref 79.4–94.8)
MONOCYTES ABSOLUTE: 0.46 K/UL (ref 0.24–0.82)
MONOCYTES RELATIVE PERCENT: 6.3 % (ref 4.7–12.5)
NEUTROPHILS ABSOLUTE: 4.26 K/UL (ref 1.56–6.13)
NEUTROPHILS RELATIVE PERCENT: 58.1 % (ref 34–71.1)
PDW BLD-RTO: 12.7 % (ref 11.7–14.4)
PLATELET # BLD: 247 K/UL (ref 182–369)
PMV BLD AUTO: 10.2 FL (ref 7.4–10.4)
POTASSIUM SERPL-SCNC: 4.3 MMOL/L (ref 3.5–5.1)
RBC # BLD: 4.52 M/UL (ref 3.93–5.22)
SODIUM BLD-SCNC: 141 MMOL/L (ref 137–145)
TOTAL PROTEIN: 7.5 G/DL (ref 6.3–8.2)
WBC # BLD: 7.33 K/UL (ref 3.98–10.04)

## 2020-09-08 PROCEDURE — 1123F ACP DISCUSS/DSCN MKR DOCD: CPT | Performed by: INTERNAL MEDICINE

## 2020-09-08 PROCEDURE — 99211 OFF/OP EST MAY X REQ PHY/QHP: CPT

## 2020-09-08 PROCEDURE — 82378 CARCINOEMBRYONIC ANTIGEN: CPT

## 2020-09-08 PROCEDURE — 3017F COLORECTAL CA SCREEN DOC REV: CPT | Performed by: INTERNAL MEDICINE

## 2020-09-08 PROCEDURE — 4040F PNEUMOC VAC/ADMIN/RCVD: CPT | Performed by: INTERNAL MEDICINE

## 2020-09-08 PROCEDURE — 86300 IMMUNOASSAY TUMOR CA 15-3: CPT

## 2020-09-08 PROCEDURE — 80053 COMPREHEN METABOLIC PANEL: CPT

## 2020-09-08 PROCEDURE — G8427 DOCREV CUR MEDS BY ELIG CLIN: HCPCS | Performed by: INTERNAL MEDICINE

## 2020-09-08 PROCEDURE — 99214 OFFICE O/P EST MOD 30 MIN: CPT | Performed by: INTERNAL MEDICINE

## 2020-09-08 PROCEDURE — G8420 CALC BMI NORM PARAMETERS: HCPCS | Performed by: INTERNAL MEDICINE

## 2020-09-08 PROCEDURE — 85025 COMPLETE CBC W/AUTO DIFF WBC: CPT

## 2020-09-08 PROCEDURE — 1036F TOBACCO NON-USER: CPT | Performed by: INTERNAL MEDICINE

## 2020-09-08 PROCEDURE — G8400 PT W/DXA NO RESULTS DOC: HCPCS | Performed by: INTERNAL MEDICINE

## 2020-09-08 PROCEDURE — 1090F PRES/ABSN URINE INCON ASSESS: CPT | Performed by: INTERNAL MEDICINE

## 2020-10-08 PROBLEM — Z00.00 HEALTH CARE MAINTENANCE: Status: RESOLVED | Noted: 2020-09-08 | Resolved: 2020-10-08

## 2021-03-08 NOTE — PROGRESS NOTES
Patient:  Denny Clifford  YOB: 1950  Date of Service: 3/9/2021  MRN: [id number]    Primary Care Physician: Jelani Prater MD    Chief Complaint   Patient presents with    Follow-up     Personal history of malignant phylloides tumor of breast       Patient Seen, Chart, Consults notes, Labs, Radiology studies reviewed. Subjective:  Kirby Benz is a pleasant 73-year-old  female managed with primary and secondary diagnoses as outlined:  · Left lumpectomy for a Stage I (T1 N0 M0) infiltrating breast cancer, 12/19/2006  · Lazara quit smoking on 10/1/2018. · Tumor screening and health maintenance    She continues working as a [employer] 3 days a week. She had COVID-19 a couple of months ago, still with fatigue symptoms. Otherwise she is doing well.     TARGET BREAST CANCER SITES:  1. Left lumpectomy 12/19/2006.     TUMOR HISTORY: Left stage I (T1 N0 M0) infiltrating breast cancer, 12/19/2006  On 12/19/2006 Lazara underwent a left lumpectomy and axillary dissection. Pathology revealed a 2cm grade III, ER positive, PA negative, [id number] 3+ positive breast cancer. The axillary lymph node dissection was negative.      She received 4 courses of adjuvant Adriamycin/Cytoxan followed by 4 courses of Taxotere. Kirby Benz also received adjuvant Herceptin that was completed on 04/21/08.      Adjuvant radiation therapy to the left breast consisted of 5,040cGy followed by a 1,000cGy boost for a total of 6,040cGy completed on 9/27/07.       Lazara received adjuvant endocrine therapy with aromatase inhibitor Arimidex that was started in July 2007. A 5 year course of Arimidex was completed in July 2012.      TREATMENT SUMMARY:  1. Left lumpectomy 12/19/2006   2. Adriamycin and Cytoxan x 4.   3. Taxotere x 4.   4. Herceptin. 5. Adjuvant radiation therapy to the left breast consisted of 5,040cGy followed by a 1,000cGy boost for a total of 6,040cGy completed on 9/27/07   6.  Adjuvant endocrine therapy with aromatase inhibitor urgency. Musculoskeletal: Negative for back pain, joint swelling, myalgias or neck pain. Skin: Negative for rash or petechiae. Neurological: Negative for tremors, seizures, syncope, weakness or headaches. Hematological: No active bruising or bleeding. Psychiatric/Behavioral: Negative for hallucinations. Objective:  Vital Signs: Blood pressure (!) 144/62, pulse 81, temperature 97.5 °F (36.4 °C), height 5' 7\" (1.702 m), weight 150 lb 14.4 oz (68.4 kg), SpO2 99 %. Physical Exam   Constitutional: Oriented to person, place, and time. No acute distress. Head: Normocephalic and atraumatic. Nose: Nose normal.   Mouth/Throat: Oropharynx is clear and moist. No oropharyngeal exudate. Eyes: Pupils are equal and round. Conjunctivae and EOM are normal. No scleral icterus. Neck: Normal range of motion. Neck supple. No JVD. No appreciable thyromegaly. Cardiovascular: Normal rate, regular rhythm, normal heart sounds and intact distal pulses. Exam reveals no gallop, murmurs or friction rub. Pulmonary/Chest: Effort normal and breath sounds normal. No respiratory distress. No wheezes. Abdominal: Soft. Bowel sounds are normal. No organomegally or masses. No tenderness. There is no rebound and no guarding. Musculoskeletal: Normal range of motion. No edema or tenderness. Lymphadenopathy: No cervical, axillary or inguinal lymphadenopathy. Neurological: Alert and oriented to person, place, and time. Cranial nerves are intact. Neurological exam is nonfocal  Skin: Skin is warm and dry. No rash noted. No erythema. No pallor. Psychiatric: Judgment normal.     Labs:  BMP: No results for input(s): NA, K, CL, CO2, PHOS, BUN, CREATININE, CALCIUM in the last 72 hours. CBC:   No results for input(s): WBC, HGB, HCT, MCV, PLT in the last 72 hours. LIVER PROFILE: No results for input(s): AST, ALT, LIPASE, BILIDIR, BILITOT, ALKPHOS in the last 72 hours. Invalid input(s):   AMYLASE,  ALB  PT/INR: No results for input(s): PROTIME, INR in the last 72 hours. APTT: No results for input(s): APTT in the last 72 hours. BNP:  No results for input(s): BNP in the last 72 hours. Ionized Calcium:No results for input(s): IONCA in the last 72 hours. Magnesium:No results for input(s): MG in the last 72 hours. Phosphorus:No results for input(s): PHOS in the last 72 hours. HgbA1C: No results for input(s): LABA1C in the last 72 hours. Hepatic: No results for input(s): ALKPHOS, ALT, AST, PROT, BILITOT, BILIDIR, LABALBU in the last 72 hours. Lactic Acid: No results for input(s): LACTA in the last 72 hours. Troponin: No results for input(s): CKTOTAL, CKMB, TROPONINT in the last 72 hours. ABGs: No results for input(s): PH, PCO2, PO2, HCO3, O2SAT in the last 72 hours. CRP:  No results for input(s): CRP in the last 72 hours. Sed Rate:  No results for input(s): SEDRATE in the last 72 hours. Cultures:   No results for input(s): CULTURE in the last 72 hours. Radiology reports as per the Radiologist  Radiology: No results found. ASSESSMENT AND PLAN:  Janet Ochoa is a pleasant 75-year-old  female managed with primary and secondary diagnoses as outlined:  · Left lumpectomy for a Stage I (T1 N0 M0) infiltrating breast cancer, 12/19/2006  · Lazara quit smoking on 10/1/2018. · Tumor screening and health maintenance    She continues working as a [employer] 3 days a week. She had COVID-19 a couple of months ago, still with fatigue symptoms. Otherwise she is doing well. #1  Left lumpectomy for a Stage I (T1 N0 M0) infiltrating breast cancer, 12/19/2006    Physical examination today, 3/9/2021:  Bilateral breast examination is performed and without new findings. The left nipple is chronically inverted and fibrocystic disease to the inner medial right breast adjacent to the nipple is unchanged. There are no cervical, supra/infraclavicular or axillary adenopathies. The abdomen is benign.  Lungs are clear though Janet Ochoa does have MARSHALL DIGITAL SCREEN W OR WO CAD BILATERAL; Future  -     Cancer Antigen 15-3; Future  -     CEA; Future  -     Comprehensive Metabolic Panel; Future    Health care maintenance    Colon cancer screening    Encounter for screening mammogram for malignant neoplasm of breast  -     MARSHALL DIGITAL SCREEN W OR WO CAD BILATERAL; Future        Orders Placed This Encounter   Procedures    MARSHALL DIGITAL SCREEN W OR WO CAD BILATERAL     Standing Status:   Future     Standing Expiration Date:   5/9/2022     Scheduling Instructions: At [facility]     Order Specific Question:   Reason for exam:     Answer:   breast ca screening; pers hx breast cancer    Cancer Antigen 15-3     Standing Status:   Future     Standing Expiration Date:   3/9/2022    CEA     Standing Status:   Future     Standing Expiration Date:   3/9/2022    Comprehensive Metabolic Panel     Standing Status:   Future     Standing Expiration Date:   3/9/2022       No orders of the defined types were placed in this encounter. Return in about 6 months (around 9/9/2021). I, Dr. Tonia Portillo, personally performed the services described in this documentation as scribed by [facility] LPN in my presence, and it is both accurate and complete.

## 2021-03-09 ENCOUNTER — HOSPITAL ENCOUNTER (OUTPATIENT)
Dept: INFUSION THERAPY | Age: 71
Discharge: HOME | End: 2021-03-09
Payer: COMMERCIAL

## 2021-03-09 ENCOUNTER — OFFICE VISIT (OUTPATIENT)
Dept: HEMATOLOGY | Age: 71
End: 2021-03-09
Payer: COMMERCIAL

## 2021-03-09 VITALS
BODY MASS INDEX: 23.69 KG/M2 | OXYGEN SATURATION: 99 % | HEART RATE: 81 BPM | HEIGHT: 67 IN | TEMPERATURE: 97.5 F | DIASTOLIC BLOOD PRESSURE: 62 MMHG | WEIGHT: 150.9 LBS | SYSTOLIC BLOOD PRESSURE: 144 MMHG

## 2021-03-09 DIAGNOSIS — Z12.11 COLON CANCER SCREENING: ICD-10-CM

## 2021-03-09 DIAGNOSIS — Z85.3 PERSONAL HISTORY OF MALIGNANT PHYLLOIDES TUMOR OF BREAST: Primary | ICD-10-CM

## 2021-03-09 DIAGNOSIS — Z12.31 ENCOUNTER FOR SCREENING MAMMOGRAM FOR MALIGNANT NEOPLASM OF BREAST: ICD-10-CM

## 2021-03-09 DIAGNOSIS — Z00.00 HEALTH CARE MAINTENANCE: ICD-10-CM

## 2021-03-09 DIAGNOSIS — Z85.3 PERSONAL HISTORY OF MALIGNANT PHYLLOIDES TUMOR OF BREAST: ICD-10-CM

## 2021-03-09 LAB
ALBUMIN SERPL-MCNC: 4.6 G/DL (ref 3.5–5.2)
ALP BLD-CCNC: 82 U/L (ref 35–104)
ALT SERPL-CCNC: 20 U/L (ref 9–52)
ANION GAP SERPL CALCULATED.3IONS-SCNC: 8 MMOL/L (ref 7–19)
AST SERPL-CCNC: 31 U/L (ref 14–36)
BASOPHILS ABSOLUTE: 0.04 K/UL (ref 0.01–0.08)
BASOPHILS RELATIVE PERCENT: 0.6 % (ref 0.1–1.2)
BILIRUB SERPL-MCNC: 0.8 MG/DL (ref 0.2–1.3)
BUN BLDV-MCNC: 24 MG/DL (ref 7–17)
CA 15-3: 25.5 U/ML (ref 0–35)
CALCIUM SERPL-MCNC: 9.5 MG/DL (ref 8.4–10.2)
CEA: 0.9 NG/ML (ref 0–3)
CHLORIDE BLD-SCNC: 105 MMOL/L (ref 98–111)
CO2: 31 MMOL/L (ref 22–29)
CREAT SERPL-MCNC: 0.6 MG/DL (ref 0.5–1)
EOSINOPHILS ABSOLUTE: 0.15 K/UL (ref 0.04–0.54)
EOSINOPHILS RELATIVE PERCENT: 2.1 % (ref 0.7–7)
GFR NON-AFRICAN AMERICAN: >60
GLOBULIN: 2.6 G/DL
GLUCOSE BLD-MCNC: 87 MG/DL (ref 74–106)
HCT VFR BLD CALC: 37.7 % (ref 34.1–44.9)
HEMOGLOBIN: 12.2 G/DL (ref 11.2–15.7)
LYMPHOCYTES ABSOLUTE: 2.45 K/UL (ref 1.18–3.74)
LYMPHOCYTES RELATIVE PERCENT: 34.5 % (ref 19.3–53.1)
MCH RBC QN AUTO: 30.8 PG (ref 25.6–32.2)
MCHC RBC AUTO-ENTMCNC: 32.4 G/DL (ref 32.3–35.5)
MCV RBC AUTO: 95.2 FL (ref 79.4–94.8)
MONOCYTES ABSOLUTE: 0.37 K/UL (ref 0.24–0.82)
MONOCYTES RELATIVE PERCENT: 5.2 % (ref 4.7–12.5)
NEUTROPHILS ABSOLUTE: 4.1 K/UL (ref 1.56–6.13)
NEUTROPHILS RELATIVE PERCENT: 57.6 % (ref 34–71.1)
PDW BLD-RTO: 12.9 % (ref 11.7–14.4)
PLATELET # BLD: 249 K/UL (ref 182–369)
PMV BLD AUTO: 9 FL (ref 7.4–10.4)
POTASSIUM SERPL-SCNC: 4.6 MMOL/L (ref 3.5–5.1)
RBC # BLD: 3.96 M/UL (ref 3.93–5.22)
SODIUM BLD-SCNC: 144 MMOL/L (ref 137–145)
TOTAL PROTEIN: 7.2 G/DL (ref 6.3–8.2)
WBC # BLD: 7.11 K/UL (ref 3.98–10.04)

## 2021-03-09 PROCEDURE — 86300 IMMUNOASSAY TUMOR CA 15-3: CPT

## 2021-03-09 PROCEDURE — 4040F PNEUMOC VAC/ADMIN/RCVD: CPT | Performed by: INTERNAL MEDICINE

## 2021-03-09 PROCEDURE — 99211 OFF/OP EST MAY X REQ PHY/QHP: CPT

## 2021-03-09 PROCEDURE — 99213 OFFICE O/P EST LOW 20 MIN: CPT | Performed by: INTERNAL MEDICINE

## 2021-03-09 PROCEDURE — 1090F PRES/ABSN URINE INCON ASSESS: CPT | Performed by: INTERNAL MEDICINE

## 2021-03-09 PROCEDURE — G8400 PT W/DXA NO RESULTS DOC: HCPCS | Performed by: INTERNAL MEDICINE

## 2021-03-09 PROCEDURE — 85025 COMPLETE CBC W/AUTO DIFF WBC: CPT

## 2021-03-09 PROCEDURE — 1036F TOBACCO NON-USER: CPT | Performed by: INTERNAL MEDICINE

## 2021-03-09 PROCEDURE — 80053 COMPREHEN METABOLIC PANEL: CPT

## 2021-03-09 PROCEDURE — G8484 FLU IMMUNIZE NO ADMIN: HCPCS | Performed by: INTERNAL MEDICINE

## 2021-03-09 PROCEDURE — 82378 CARCINOEMBRYONIC ANTIGEN: CPT

## 2021-03-09 PROCEDURE — G8427 DOCREV CUR MEDS BY ELIG CLIN: HCPCS | Performed by: INTERNAL MEDICINE

## 2021-03-09 PROCEDURE — G8420 CALC BMI NORM PARAMETERS: HCPCS | Performed by: INTERNAL MEDICINE

## 2021-03-09 PROCEDURE — 1123F ACP DISCUSS/DSCN MKR DOCD: CPT | Performed by: INTERNAL MEDICINE

## 2021-03-09 PROCEDURE — 3017F COLORECTAL CA SCREEN DOC REV: CPT | Performed by: INTERNAL MEDICINE

## 2021-08-24 NOTE — PROGRESS NOTES
Patient:  Topher Rodriguez  YOB: 1950  Date of Service: 9/9/2021  MRN: [id number]    Primary Care Physician: Liliana Drummond MD    Chief Complaint   Patient presents with    Follow-up     Personal history of malignant phylloides tumor of breast       Patient Seen, Chart, Consults notes, Labs, Radiology studies reviewed. Subjective:  Madi Patricia is a pleasant 68-year-old  female managed with primary and secondary diagnoses as outlined:  · Left lumpectomy for a Stage I (T1 N0 M0) infiltrating breast cancer, 12/19/2006  · Lazara quit smoking on 10/1/2018. 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 She had COVID-19 in ~January 2021, still with fatigue symptoms. Otherwise she is doing well.     TARGET BREAST CANCER SITES:  1. Left lumpectomy 12/19/2006.     TUMOR HISTORY: Left stage I (T1 N0 M0) infiltrating breast cancer, 12/19/2006  On 12/19/2006 Lazara underwent a left lumpectomy and axillary dissection. Pathology revealed a 2cm grade III, ER positive, NE negative, [id number] 3+ positive breast cancer. The axillary lymph node dissection was negative.      She received 4 courses of adjuvant Adriamycin/Cytoxan followed by 4 courses of Taxotere. Madi Patricia also received adjuvant Herceptin that was completed on 04/21/08.      Adjuvant radiation therapy to the left breast consisted of 5,040cGy followed by a 1,000cGy boost for a total of 6,040cGy completed on 9/27/07.       Lazara received adjuvant endocrine therapy with aromatase inhibitor Arimidex that was started in July 2007. A 5 year course of Arimidex was completed in July 2012.      TREATMENT SUMMARY:  1. Left lumpectomy 12/19/2006   2. Adriamycin and Cytoxan x 4.   3. Taxotere x 4.   4. Herceptin. 5. Adjuvant radiation therapy to the left breast consisted of 5,040cGy followed by a 1,000cGy boost for a total of 6,040cGy completed on 9/27/07   6.  Adjuvant endocrine therapy with aromatase inhibitor Arimidex was started in 7/2007 and completed in 7/2012.          Allergies:  No known allergies    Medicines:  Current Outpatient Medications   Medication Sig Dispense Refill    MULTIPLE MINERALS-VITAMINS PO Take by mouth      Ascorbic Acid (VITAMIN C) 1000 MG tablet Take 1,000 mg by mouth      aspirin EC 81 MG EC tablet Take 81 mg by mouth      vitamin E 1000 units capsule Take 1,000 Units by mouth       No current facility-administered medications for this visit. Past Medical History:      Diagnosis Date    BMI 22.0-22.9, adult     COPD (chronic obstructive pulmonary disease) (HCC)     Estrogen receptor positive     Fibrocystic breast     Hypercholesterolemia     Hypertriglyceridemia     Nicotine dependence     Personal history of malignant neoplasm of breast     Personal history of malignant phylloides tumor of breast         Past Surgical History:      Procedure Laterality Date    BREAST LUMPECTOMY Left     and axillary dissection    CHOLECYSTECTOMY  09/2006    Dr Lino Espinoza        Family History:      Problem Relation Age of Onset    Breast Cancer Neg Hx         Social History  Social History     Tobacco Use    Smoking status: Former Smoker     Quit date: 2018     Years since quitting: 3.6    Smokeless tobacco: Never Used   Vaping Use    Vaping Use: Never used   Substance Use Topics    Alcohol use: Never    Drug use: Never          Review of Systems:  Constitutional: Negative for chills, fatigue, fever or significant weight loss. HENT: Negative for congestion, hearing loss, nosebleeds or sore throat. Eyes: Negative for photophobia, pain, discharge, redness and visual disturbance. Respiratory: Negative for cough, shortness of breath, or wheezing. Cardiovascular: Negative for chest pain, palpitations or leg swelling. Gastrointestinal: Negative for abdominal pain, blood in stool, constipation, diarrhea, nausea or vomiting.    Genitourinary: Negative for AMYLASE,  ALB  PT/INR: No results for input(s): PROTIME, INR in the last 72 hours. APTT: No results for input(s): APTT in the last 72 hours. BNP:  No results for input(s): BNP in the last 72 hours. Ionized Calcium:No results for input(s): IONCA in the last 72 hours. Magnesium:No results for input(s): MG in the last 72 hours. Phosphorus:No results for input(s): PHOS in the last 72 hours. HgbA1C: No results for input(s): LABA1C in the last 72 hours. Hepatic: No results for input(s): ALKPHOS, ALT, AST, PROT, BILITOT, BILIDIR, LABALBU in the last 72 hours. Lactic Acid: No results for input(s): LACTA in the last 72 hours. Troponin: No results for input(s): CKTOTAL, CKMB, TROPONINT in the last 72 hours. ABGs: No results for input(s): PH, PCO2, PO2, HCO3, O2SAT in the last 72 hours. CRP:  No results for input(s): CRP in the last 72 hours. Sed Rate:  No results for input(s): SEDRATE in the last 72 hours. Cultures:   No results for input(s): CULTURE in the last 72 hours. Radiology reports as per the Radiologist  Radiology: No results found. ASSESSMENT AND PLAN:  Nabeel Bansal is a pleasant 47-year-old  female managed with primary and secondary diagnoses as outlined:  · Left lumpectomy for a Stage I (T1 N0 M0) infiltrating breast cancer, 12/19/2006  · Lazara quit smoking on 10/1/2018. 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 She had COVID-19 in ~January 2021, still with fatigue symptoms. Otherwise she is doing well. #1  Left lumpectomy for a Stage I (T1 N0 M0) infiltrating breast cancer, 12/19/2006    Physical examination today, 9/9/2021:  Bilateral breast examination is performed and without new findings. The left nipple is chronically inverted and fibrocystic disease to the inner medial right breast adjacent to the nipple is unchanged. There are no cervical, supra/infraclavicular or axillary adenopathies.  The abdomen is benign. Lungs are clear though Gloria Ferris does have a dry cough. CBC 2/27/2020 was stable with a WBC of 7.35, hemoglobin 13.1 and a platelet count of 932,827. CBC 9/8/2020 revealed a WBC of 7.33. Hgb is 14.1 with an MCV of 99.1 and platelet count of 663,332. CBC 03/09/2021 revealed a WBC of 7.11 . Hgb 12.2 is  with an MCV of 95.2 and platelet count of 825,017 . CBC today 09/09/2021 reveals a WBC of 5.74. Hgb is 13.3 with an MCV of 94.6 and platelet count of 664,892. Serology on 2/27/2020 revealed:  CMP- WNL  CEA- 0.6  CA 15.3- 24.3    Serology on 9/8/2020 revealed:  CMP - WNL  CEA - 0.7  CA 15.3 25.90    Serology on 03/09/2021 revealed:  CMP-WNL  CEA-0.9  CA 15.3-25.5    Tumor markers with  CA 15-3 and CEA were both previously normal, and will be repeated today      Follow-up in 6 months. #2  Breast cancer screening    Bilateral mammograms on 7/29/2020 at \[facility]"" was a BI-RADS Category 1 with a one-year follow-up recommended. Bilateral breast examination today, is performed and without new findings    #3  GI cancer screening    Colonoscopy performed on 5/28/2019 by Dr. Homero Anaya at [facility] showed moderate diverticulosis throughout the descending and sigmoid colons, otherwise normal.   Pathology negative.       #4  GYN cancer screening    Ms. Barney will be seeing MAGNUS Mccann in [facility] as PCP and GYN monitoring. She admits it has been a while since she had a Pap smear. She will get this taken care of. #5  Lung cancer screening    Previous smoker. Lazara quit smoking on 10/1/2018. CT chest on 7/22/2019 documented:   · No evidence of hilar or mediastinal adenopathy. · Mild chronic interstitial changes with no areas of acute consolidation  · No pleural effusions nor discrete parenchymal nodules        Plans:  1. Conservative monitoring will continue.    2. Follow-up 6 months, sooner if needed with lab work.  Thais Fritz

## 2021-09-03 DIAGNOSIS — Z85.3 PERSONAL HISTORY OF MALIGNANT PHYLLOIDES TUMOR OF BREAST: Primary | ICD-10-CM

## 2021-09-09 ENCOUNTER — OFFICE VISIT (OUTPATIENT)
Dept: HEMATOLOGY | Age: 71
End: 2021-09-09
Payer: COMMERCIAL

## 2021-09-09 ENCOUNTER — HOSPITAL ENCOUNTER (OUTPATIENT)
Dept: INFUSION THERAPY | Age: 71
Discharge: HOME | End: 2021-09-09
Payer: COMMERCIAL

## 2021-09-09 VITALS
OXYGEN SATURATION: 99 % | BODY MASS INDEX: 22.96 KG/M2 | WEIGHT: 146.3 LBS | SYSTOLIC BLOOD PRESSURE: 112 MMHG | HEART RATE: 63 BPM | DIASTOLIC BLOOD PRESSURE: 72 MMHG | HEIGHT: 67 IN

## 2021-09-09 DIAGNOSIS — Z85.3 PERSONAL HISTORY OF MALIGNANT PHYLLOIDES TUMOR OF BREAST: Primary | ICD-10-CM

## 2021-09-09 DIAGNOSIS — Z12.31 ENCOUNTER FOR SCREENING MAMMOGRAM FOR MALIGNANT NEOPLASM OF BREAST: ICD-10-CM

## 2021-09-09 DIAGNOSIS — Z85.3 PERSONAL HISTORY OF MALIGNANT PHYLLOIDES TUMOR OF BREAST: ICD-10-CM

## 2021-09-09 DIAGNOSIS — Z00.00 HEALTH CARE MAINTENANCE: ICD-10-CM

## 2021-09-09 LAB
BASOPHILS ABSOLUTE: 0.03 K/UL (ref 0.01–0.08)
BASOPHILS RELATIVE PERCENT: 0.5 % (ref 0.1–1.2)
CA 15-3: 15.3 U/ML (ref 0–35)
CEA: 0.7 NG/ML (ref 0–3)
EOSINOPHILS ABSOLUTE: 0.13 K/UL (ref 0.04–0.54)
EOSINOPHILS RELATIVE PERCENT: 2.3 % (ref 0.7–7)
HCT VFR BLD CALC: 40.4 % (ref 34.1–44.9)
HEMOGLOBIN: 13.3 G/DL (ref 11.2–15.7)
LYMPHOCYTES ABSOLUTE: 2.3 K/UL (ref 1.18–3.74)
LYMPHOCYTES RELATIVE PERCENT: 40.1 % (ref 19.3–53.1)
MCH RBC QN AUTO: 31.1 PG (ref 25.6–32.2)
MCHC RBC AUTO-ENTMCNC: 32.9 G/DL (ref 32.3–35.5)
MCV RBC AUTO: 94.6 FL (ref 79.4–94.8)
MONOCYTES ABSOLUTE: 0.42 K/UL (ref 0.24–0.82)
MONOCYTES RELATIVE PERCENT: 7.3 % (ref 4.7–12.5)
NEUTROPHILS ABSOLUTE: 2.86 K/UL (ref 1.56–6.13)
NEUTROPHILS RELATIVE PERCENT: 49.8 % (ref 34–71.1)
PDW BLD-RTO: 12.6 % (ref 11.7–14.4)
PLATELET # BLD: 283 K/UL (ref 182–369)
PMV BLD AUTO: 9.4 FL (ref 7.4–10.4)
RBC # BLD: 4.27 M/UL (ref 3.93–5.22)
WBC # BLD: 5.74 K/UL (ref 3.98–10.04)

## 2021-09-09 PROCEDURE — 86300 IMMUNOASSAY TUMOR CA 15-3: CPT

## 2021-09-09 PROCEDURE — 1123F ACP DISCUSS/DSCN MKR DOCD: CPT | Performed by: INTERNAL MEDICINE

## 2021-09-09 PROCEDURE — 3017F COLORECTAL CA SCREEN DOC REV: CPT | Performed by: INTERNAL MEDICINE

## 2021-09-09 PROCEDURE — 99213 OFFICE O/P EST LOW 20 MIN: CPT

## 2021-09-09 PROCEDURE — 36415 COLL VENOUS BLD VENIPUNCTURE: CPT

## 2021-09-09 PROCEDURE — 1036F TOBACCO NON-USER: CPT | Performed by: INTERNAL MEDICINE

## 2021-09-09 PROCEDURE — 82378 CARCINOEMBRYONIC ANTIGEN: CPT

## 2021-09-09 PROCEDURE — G8400 PT W/DXA NO RESULTS DOC: HCPCS | Performed by: INTERNAL MEDICINE

## 2021-09-09 PROCEDURE — 4040F PNEUMOC VAC/ADMIN/RCVD: CPT | Performed by: INTERNAL MEDICINE

## 2021-09-09 PROCEDURE — G8420 CALC BMI NORM PARAMETERS: HCPCS | Performed by: INTERNAL MEDICINE

## 2021-09-09 PROCEDURE — 99214 OFFICE O/P EST MOD 30 MIN: CPT | Performed by: INTERNAL MEDICINE

## 2021-09-09 PROCEDURE — G8427 DOCREV CUR MEDS BY ELIG CLIN: HCPCS | Performed by: INTERNAL MEDICINE

## 2021-09-09 PROCEDURE — 85025 COMPLETE CBC W/AUTO DIFF WBC: CPT

## 2021-09-09 PROCEDURE — 1090F PRES/ABSN URINE INCON ASSESS: CPT | Performed by: INTERNAL MEDICINE

## 2022-03-03 NOTE — PROGRESS NOTES
Patient:  Demetria Daigle  YOB: 1950  Date of Service: 3/10/2022  MRN: [id number]    Primary Care Physician: Jess Snow MD    Chief Complaint   Patient presents with    Follow-up     Personal history of malignant phylloides tumor of breast       Patient Seen, Chart, Consults notes, Labs, Radiology studies reviewed. Subjective:  Sabine Zambrano is a pleasant 70-year-old  female managed with primary and secondary diagnoses as outlined:  · Left lumpectomy for a Stage I (T1 N0 M0) infiltrating breast cancer, 12/19/2006  · Lazara quit smoking on 10/1/2018. 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 She had COVID-19 in ~January 2021, still with fatigue symptoms. She is also getting tired of working due to the increased red tape and inability to get workers to help where she is working.     TARGET BREAST CANCER SITES:  1. Left lumpectomy 12/19/2006.     TUMOR HISTORY: Left stage I (T1 N0 M0) infiltrating breast cancer, 12/19/2006    On 12/19/2006 Lazara underwent a left lumpectomy and axillary dissection. Pathology revealed a 2cm grade III, ER positive, MT negative, emb1ynw 3+ positive breast cancer. The axillary lymph node dissection was negative.      She received 4 courses of adjuvant Adriamycin/Cytoxan followed by 4 courses of Taxotere. Sabine Zambrano also received adjuvant Herceptin that was completed on 04/21/08.      Adjuvant radiation therapy to the left breast consisted of 5,040cGy followed by a 1,000cGy boost for a total of 6,040cGy completed on 9/27/07.       Lazara received adjuvant endocrine therapy with aromatase inhibitor Arimidex that was started in July 2007. A 5 year course of Arimidex was completed in July 2012.      TREATMENT SUMMARY:  1. Left lumpectomy 12/19/2006   2. Adriamycin and Cytoxan x 4.   3. Taxotere x 4.   4. Herceptin.    5. Adjuvant radiation therapy to the left breast consisted of 5,040cGy followed by a 1,000cGy boost for a total of 6,040cGy completed on 07   6. Adjuvant endocrine therapy with aromatase inhibitor Arimidex was started in 2007 and completed in 2012.          Allergies:  No known allergies    Medicines:  Current Outpatient Medications   Medication Sig Dispense Refill    MULTIPLE MINERALS-VITAMINS PO Take by mouth      Ascorbic Acid (VITAMIN C) 1000 MG tablet Take 1,000 mg by mouth      aspirin EC 81 MG EC tablet Take 81 mg by mouth      vitamin E 1000 units capsule Take 1,000 Units by mouth       No current facility-administered medications for this visit. Past Medical History:      Diagnosis Date    BMI 22.0-22.9, adult     COPD (chronic obstructive pulmonary disease) (HCC)     Estrogen receptor positive     Fibrocystic breast     Hypercholesterolemia     Hypertriglyceridemia     Nicotine dependence     Personal history of malignant neoplasm of breast     Personal history of malignant phylloides tumor of breast         Past Surgical History:      Procedure Laterality Date    BREAST LUMPECTOMY Left     and axillary dissection    CHOLECYSTECTOMY  2006    Dr Hero Gabriel        Family History:      Problem Relation Age of Onset    Breast Cancer Neg Hx         Social History  Social History     Tobacco Use    Smoking status: Former Smoker     Quit date:      Years since quittin.1    Smokeless tobacco: Never Used   Vaping Use    Vaping Use: Never used   Substance Use Topics    Alcohol use: Never    Drug use: Never            Objective:  Vital Signs: Blood pressure 130/78, pulse 82, height 5' 7\" (1.702 m), weight 148 lb (67.1 kg), SpO2 98 %. Labs:  BMP: No results for input(s): NA, K, CL, CO2, PHOS, BUN, CREATININE, CALCIUM in the last 72 hours.   CBC:   Recent Labs     03/10/22  0924   WBC 6.69   HGB 14.5   HCT 45.0*   MCV 95.5*        LIVER PROFILE: No results for input(s): AST, ALT, LIPASE, BILIDIR, BILITOT, ALKPHOS in the last 72 hours.    Invalid input(s): AMYLASE,  ALB  PT/INR: No results for input(s): PROTIME, INR in the last 72 hours. APTT: No results for input(s): APTT in the last 72 hours. BNP:  No results for input(s): BNP in the last 72 hours. Ionized Calcium:No results for input(s): IONCA in the last 72 hours. Magnesium:No results for input(s): MG in the last 72 hours. Phosphorus:No results for input(s): PHOS in the last 72 hours. HgbA1C: No results for input(s): LABA1C in the last 72 hours. Hepatic: No results for input(s): ALKPHOS, ALT, AST, PROT, BILITOT, BILIDIR, LABALBU in the last 72 hours. Lactic Acid: No results for input(s): LACTA in the last 72 hours. Troponin: No results for input(s): CKTOTAL, CKMB, TROPONINT in the last 72 hours. ABGs: No results for input(s): PH, PCO2, PO2, HCO3, O2SAT in the last 72 hours. CRP:  No results for input(s): CRP in the last 72 hours. Sed Rate:  No results for input(s): SEDRATE in the last 72 hours. Cultures:   No results for input(s): CULTURE in the last 72 hours. Radiology reports as per the Radiologist  Radiology: No results found. ASSESSMENT AND PLAN:    Fco Robles is a pleasant 77-year-old  female managed with primary and secondary diagnoses as outlined:  · Left lumpectomy for a Stage I (T1 N0 M0) infiltrating breast cancer, 12/19/2006  · Lazara quit smoking on 10/1/2018. 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 She had COVID-19 in ~January 2021, still with fatigue symptoms. She is also getting tired of working due to the increased red tape and inability to get workers to help where she is working. #1  Left lumpectomy for a Stage I (T1 N0 M0) infiltrating breast cancer, 12/19/2006    Physical examination today, 3/10/2022:  Bilateral breast examination is performed and without new findings.  The left nipple is chronically inverted and fibrocystic disease to the inner medial right breast adjacent to the nipple is unchanged. There are no cervical, supra/infraclavicular or axillary adenopathies. The abdomen is benign. Lungs are clear. CBC 2/27/2020 was stable with a WBC of 7.35, hemoglobin 13.1 and a platelet count of 694,760. CBC 9/8/2020 revealed a WBC of 7.33. Hgb is 14.1 with an MCV of 99.1 and platelet count of 970,852. CBC 03/09/2021 revealed a WBC of 7.11 . Hgb 12.2 is  with an MCV of 95.2 and platelet count of 764,882 . CBC 09/09/2021 revealed a WBC of 5.74. Hgb is 13.3 with an MCV of 94.6 and platelet count of 642,094    CBC today 3/10/2022  reveals a WBC of 6.69  Hgb is 14.5 with an MCV of 95.5 and platelet count of 419,074. Braydon Wallace Serology on 2/27/2020 revealed:  CMP- WNL  CEA- 0.6  CA 15.3- 24.3    Serology on 9/8/2020 revealed:  CMP - WNL  CEA - 0.7  CA 15.3 25.90    Serology on 03/09/2021 revealed:  CMP-WNL  CEA-0.9  CA 15.3-25.5    Serology on 9/9/2021 revealed:  CEA-0.7  CA 15.3-15.3    Tumor markers with  CA 15-3 and CEA were both previously normal, and will be repeated today      Follow-up in 7 months after mammograms that are scheduled for October 2022. #2  Breast cancer screening    Bilateral mammograms on 10/14/2021 at [facility] was a BI-RADS Category 1 with a one-year follow-up recommended. Bilateral breast examination today, is performed and without new findings    #3  GI cancer screening    Colonoscopy performed on 5/28/2019 by Dr. Suzi Cr at [facility] showed moderate diverticulosis throughout the descending and sigmoid colons, otherwise normal.   Pathology negative.       #4  GYN cancer screening    Ms. Barney will be seeing [facility] Cabrera in [facility] as PCP and GYN monitoring. She admits it has been a while since she had a Pap smear. She will get this taken care of. #5  Lung cancer screening    Previous smoker. Lazara quit smoking on 10/1/2018.     CT chest on 7/22/2019 documented:   · No evidence of hilar or mediastinal adenopathy. · Mild chronic interstitial changes with no areas of acute consolidation  · No pleural effusions nor discrete parenchymal nodules        Plans:  1. Conservative monitoring will continue. 2. Follow-up 6 months, sooner if needed with lab work.  Brianda Mo was seen today for follow-up. Diagnoses and all orders for this visit:    Personal history of malignant phylloides tumor of breast  -     Comprehensive Metabolic Panel; Future  -     CEA; Future  -     Cancer Antigen 15-3; Future  -     Comprehensive Metabolic Panel; Future  -     CEA; Future  -     Cancer Antigen 15-3; Future    Health care maintenance    Encounter for screening mammogram for malignant neoplasm of breast  -     MARSHALL DIGITAL SCREEN W OR WO CAD BILATERAL; Future        Orders Placed This Encounter   Procedures    MARSHALL DIGITAL SCREEN W OR WO CAD BILATERAL     Standing Status:   Future     Standing Expiration Date:   5/10/2023     Scheduling Instructions:      [facility]     Order Specific Question:   Reason for exam:     Answer:   Yearly mammogram    Comprehensive Metabolic Panel     Standing Status:   Future     Number of Occurrences:   1     Standing Expiration Date:   3/3/2023    CEA     Standing Status:   Future     Standing Expiration Date:   3/3/2023    Cancer Antigen 15-3     Standing Status:   Future     Standing Expiration Date:   3/3/2023    Comprehensive Metabolic Panel     Standing Status:   Future     Standing Expiration Date:   3/10/2023    CEA     Standing Status:   Future     Standing Expiration Date:   3/10/2023    Cancer Antigen 15-3     Standing Status:   Future     Standing Expiration Date:   3/10/2023       No orders of the defined types were placed in this encounter. Return in about 7 months (around 10/24/2022) for Follow Up with [facility] labs prior.

## 2022-03-10 ENCOUNTER — HOSPITAL ENCOUNTER (OUTPATIENT)
Dept: INFUSION THERAPY | Age: 72
Discharge: HOME | End: 2022-03-10
Payer: COMMERCIAL

## 2022-03-10 ENCOUNTER — OFFICE VISIT (OUTPATIENT)
Dept: HEMATOLOGY | Age: 72
End: 2022-03-10
Payer: COMMERCIAL

## 2022-03-10 VITALS
DIASTOLIC BLOOD PRESSURE: 78 MMHG | SYSTOLIC BLOOD PRESSURE: 130 MMHG | WEIGHT: 148 LBS | BODY MASS INDEX: 23.23 KG/M2 | HEIGHT: 67 IN | HEART RATE: 82 BPM | OXYGEN SATURATION: 98 %

## 2022-03-10 DIAGNOSIS — Z85.3 PERSONAL HISTORY OF MALIGNANT PHYLLOIDES TUMOR OF BREAST: ICD-10-CM

## 2022-03-10 DIAGNOSIS — Z00.00 HEALTH CARE MAINTENANCE: ICD-10-CM

## 2022-03-10 DIAGNOSIS — Z85.3 PERSONAL HISTORY OF MALIGNANT PHYLLOIDES TUMOR OF BREAST: Primary | ICD-10-CM

## 2022-03-10 DIAGNOSIS — Z12.31 ENCOUNTER FOR SCREENING MAMMOGRAM FOR MALIGNANT NEOPLASM OF BREAST: ICD-10-CM

## 2022-03-10 LAB
ALBUMIN SERPL-MCNC: 4.3 G/DL (ref 3.5–5.2)
ALP BLD-CCNC: 77 U/L (ref 35–104)
ALT SERPL-CCNC: 14 U/L (ref 9–52)
ANION GAP SERPL CALCULATED.3IONS-SCNC: 9 MMOL/L (ref 7–19)
AST SERPL-CCNC: 28 U/L (ref 14–36)
BASOPHILS ABSOLUTE: 0.06 K/UL (ref 0.01–0.08)
BASOPHILS RELATIVE PERCENT: 0.9 % (ref 0.1–1.2)
BILIRUB SERPL-MCNC: 0.5 MG/DL (ref 0.2–1.3)
BUN BLDV-MCNC: 25 MG/DL (ref 7–17)
CA 15-3: 19 U/ML (ref 0–25)
CALCIUM SERPL-MCNC: 9.6 MG/DL (ref 8.4–10.2)
CEA: 1.4 NG/ML (ref 0–4.7)
CHLORIDE BLD-SCNC: 107 MMOL/L (ref 98–111)
CO2: 30 MMOL/L (ref 22–29)
CREAT SERPL-MCNC: 0.8 MG/DL (ref 0.5–1)
EOSINOPHILS ABSOLUTE: 0.16 K/UL (ref 0.04–0.54)
EOSINOPHILS RELATIVE PERCENT: 2.4 % (ref 0.7–7)
GFR NON-AFRICAN AMERICAN: >60
GLOBULIN: 2.6 G/DL
GLUCOSE BLD-MCNC: 87 MG/DL (ref 74–106)
HCT VFR BLD CALC: 45 % (ref 34.1–44.9)
HEMOGLOBIN: 14.5 G/DL (ref 11.2–15.7)
LYMPHOCYTES ABSOLUTE: 2.13 K/UL (ref 1.18–3.74)
LYMPHOCYTES RELATIVE PERCENT: 31.8 % (ref 19.3–53.1)
MCH RBC QN AUTO: 30.8 PG (ref 25.6–32.2)
MCHC RBC AUTO-ENTMCNC: 32.2 G/DL (ref 32.3–35.5)
MCV RBC AUTO: 95.5 FL (ref 79.4–94.8)
MONOCYTES ABSOLUTE: 0.36 K/UL (ref 0.24–0.82)
MONOCYTES RELATIVE PERCENT: 5.4 % (ref 4.7–12.5)
NEUTROPHILS ABSOLUTE: 3.98 K/UL (ref 1.56–6.13)
NEUTROPHILS RELATIVE PERCENT: 59.5 % (ref 34–71.1)
PDW BLD-RTO: 12.6 % (ref 11.7–14.4)
PLATELET # BLD: 291 K/UL (ref 182–369)
PMV BLD AUTO: 9.5 FL (ref 7.4–10.4)
POTASSIUM SERPL-SCNC: 4.8 MMOL/L (ref 3.5–5.1)
RBC # BLD: 4.71 M/UL (ref 3.93–5.22)
SODIUM BLD-SCNC: 146 MMOL/L (ref 137–145)
TOTAL PROTEIN: 6.9 G/DL (ref 6.3–8.2)
WBC # BLD: 6.69 K/UL (ref 3.98–10.04)

## 2022-03-10 PROCEDURE — 85025 COMPLETE CBC W/AUTO DIFF WBC: CPT

## 2022-03-10 PROCEDURE — 99212 OFFICE O/P EST SF 10 MIN: CPT

## 2022-03-10 PROCEDURE — G8427 DOCREV CUR MEDS BY ELIG CLIN: HCPCS | Performed by: INTERNAL MEDICINE

## 2022-03-10 PROCEDURE — 3017F COLORECTAL CA SCREEN DOC REV: CPT | Performed by: INTERNAL MEDICINE

## 2022-03-10 PROCEDURE — 80053 COMPREHEN METABOLIC PANEL: CPT

## 2022-03-10 PROCEDURE — 99213 OFFICE O/P EST LOW 20 MIN: CPT | Performed by: INTERNAL MEDICINE

## 2022-03-10 PROCEDURE — 1090F PRES/ABSN URINE INCON ASSESS: CPT | Performed by: INTERNAL MEDICINE

## 2022-03-10 PROCEDURE — G8400 PT W/DXA NO RESULTS DOC: HCPCS | Performed by: INTERNAL MEDICINE

## 2022-03-10 PROCEDURE — 1123F ACP DISCUSS/DSCN MKR DOCD: CPT | Performed by: INTERNAL MEDICINE

## 2022-03-10 PROCEDURE — G8420 CALC BMI NORM PARAMETERS: HCPCS | Performed by: INTERNAL MEDICINE

## 2022-03-10 PROCEDURE — 4040F PNEUMOC VAC/ADMIN/RCVD: CPT | Performed by: INTERNAL MEDICINE

## 2022-03-10 PROCEDURE — 36415 COLL VENOUS BLD VENIPUNCTURE: CPT

## 2022-03-10 PROCEDURE — G8484 FLU IMMUNIZE NO ADMIN: HCPCS | Performed by: INTERNAL MEDICINE

## 2022-03-10 PROCEDURE — 1036F TOBACCO NON-USER: CPT | Performed by: INTERNAL MEDICINE

## 2022-10-11 DIAGNOSIS — Z85.3 PERSONAL HISTORY OF MALIGNANT PHYLLOIDES TUMOR OF BREAST: Primary | ICD-10-CM

## 2022-10-20 ENCOUNTER — HOSPITAL ENCOUNTER (OUTPATIENT)
Dept: INFUSION THERAPY | Age: 72
Discharge: HOME | End: 2022-10-20
Payer: COMMERCIAL

## 2022-10-20 DIAGNOSIS — Z85.3 PERSONAL HISTORY OF MALIGNANT PHYLLOIDES TUMOR OF BREAST: ICD-10-CM

## 2022-10-20 LAB
ALBUMIN SERPL-MCNC: 4.5 G/DL (ref 3.5–5.2)
ALP BLD-CCNC: 79 U/L (ref 35–104)
ALT SERPL-CCNC: 19 U/L (ref 9–52)
ANION GAP SERPL CALCULATED.3IONS-SCNC: 9 MMOL/L (ref 7–19)
AST SERPL-CCNC: 28 U/L (ref 14–36)
BILIRUB SERPL-MCNC: 1 MG/DL (ref 0.2–1.3)
BUN BLDV-MCNC: 28 MG/DL (ref 7–17)
CA 15-3: 19 U/ML (ref 0–25)
CALCIUM SERPL-MCNC: 9.3 MG/DL (ref 8.4–10.2)
CEA: 1.1 NG/ML (ref 0–4.7)
CHLORIDE BLD-SCNC: 107 MMOL/L (ref 98–111)
CO2: 28 MMOL/L (ref 22–29)
CREAT SERPL-MCNC: 0.8 MG/DL (ref 0.5–1)
GFR SERPL CREATININE-BSD FRML MDRD: >60 ML/MIN/{1.73_M2}
GLOBULIN: 2.8 G/DL
GLUCOSE BLD-MCNC: 96 MG/DL (ref 74–106)
HCT VFR BLD CALC: 43.2 % (ref 34.1–44.9)
HEMOGLOBIN: 13.3 G/DL (ref 11.2–15.7)
LYMPHOCYTES ABSOLUTE: 1.63 K/UL (ref 1.18–3.74)
LYMPHOCYTES RELATIVE PERCENT: 28 % (ref 19.3–53.1)
MCH RBC QN AUTO: 30.3 PG (ref 25.6–32.2)
MCHC RBC AUTO-ENTMCNC: 30.8 G/DL (ref 32.3–35.5)
MCV RBC AUTO: 98.4 FL (ref 79.4–94.8)
MONOCYTES ABSOLUTE: 0.42 K/UL (ref 0.24–0.82)
MONOCYTES RELATIVE PERCENT: 7.2 % (ref 4.7–12.5)
NEUTROPHILS ABSOLUTE: 3.48 K/UL (ref 1.56–6.13)
NEUTROPHILS RELATIVE PERCENT: 59.6 % (ref 34–71.1)
PDW BLD-RTO: 12.7 % (ref 11.7–14.4)
PLATELET # BLD: 304 K/UL (ref 182–369)
PMV BLD AUTO: 9.5 FL (ref 7.4–10.4)
POTASSIUM SERPL-SCNC: 4.8 MMOL/L (ref 3.5–5.1)
RBC # BLD: 4.39 M/UL (ref 3.93–5.22)
SODIUM BLD-SCNC: 144 MMOL/L (ref 137–145)
TOTAL PROTEIN: 7.3 G/DL (ref 6.3–8.2)
WBC # BLD: 5.83 K/UL (ref 3.98–10.04)

## 2022-10-20 PROCEDURE — 36415 COLL VENOUS BLD VENIPUNCTURE: CPT

## 2022-10-20 PROCEDURE — 80053 COMPREHEN METABOLIC PANEL: CPT

## 2022-10-20 PROCEDURE — 85025 COMPLETE CBC W/AUTO DIFF WBC: CPT

## 2022-10-26 NOTE — PROGRESS NOTES
Patient:  Vicky Cho  YOB: 1950  Date of Service: 10/27/2022  MRN: [id number]    Primary Care Physician: Damion Villarreal MD    Chief Complaint   Patient presents with    Follow-up     Personal history of malignant phylloides tumor of breast         Patient Seen, Chart, Consults notes, Labs, Radiology studies reviewed. Subjective:    Pop Conley is a pleasant 20-year-old  female managed with primary and secondary diagnoses as outlined:  Left lumpectomy for a Stage I (T1 N0 M0) infiltrating breast cancer, 12/19/2006  Lazara quit smoking on 10/1/2018. 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 She had COVID-19 in ~January 2021, still with fatigue symptoms. TARGET BREAST CANCER SITES:  Left lumpectomy 12/19/2006. TUMOR HISTORY: Left stage I (T1 N0 M0) infiltrating breast cancer, 12/19/2006    On 12/19/2006 Lazara underwent a left lumpectomy and axillary dissection. Pathology revealed a 2cm grade III, ER positive, OH negative, [id number] 3+ positive breast cancer. The axillary lymph node dissection was negative. She received 4 courses of adjuvant Adriamycin/Cytoxan followed by 4 courses of Taxotere. Pop Conley also received adjuvant Herceptin that was completed on 04/21/08. Adjuvant radiation therapy to the left breast consisted of 5,040cGy followed by a 1,000cGy boost for a total of 6,040cGy completed on 9/27/07. Lazara received adjuvant endocrine therapy with aromatase inhibitor Arimidex that was started in July 2007. A 5 year course of Arimidex was completed in July 2012. TREATMENT SUMMARY:  Left lumpectomy 12/19/2006   Adriamycin and Cytoxan x 4. Taxotere x 4. Herceptin. Adjuvant radiation therapy to the left breast consisted of 5,040cGy followed by a 1,000cGy boost for a total of 6,040cGy completed on 07   Adjuvant endocrine therapy with aromatase inhibitor Arimidex was started in 2007 and completed in 2012. Allergies:  No known allergies    Medicines:  Current Outpatient Medications   Medication Sig Dispense Refill    MULTIPLE MINERALS-VITAMINS PO Take by mouth      Ascorbic Acid (VITAMIN C) 1000 MG tablet Take 1,000 mg by mouth      aspirin EC 81 MG EC tablet Take 81 mg by mouth      vitamin E 1000 units capsule Take 1,000 Units by mouth       No current facility-administered medications for this visit. Past Medical History:      Diagnosis Date    BMI 22.0-22.9, adult     COPD (chronic obstructive pulmonary disease) (HCC)     Estrogen receptor positive     Fibrocystic breast     Hypercholesterolemia     Hypertriglyceridemia     Nicotine dependence     Personal history of malignant neoplasm of breast     Personal history of malignant phylloides tumor of breast         Past Surgical History:      Procedure Laterality Date    BREAST LUMPECTOMY Left     and axillary dissection    CHOLECYSTECTOMY  2006    Dr Alma Hill        Family History:      Problem Relation Age of Onset    Breast Cancer Neg Hx         Social History  Social History     Tobacco Use    Smoking status: Former     Types: Cigarettes     Quit date:      Years since quittin.8    Smokeless tobacco: Never   Vaping Use    Vaping Use: Never used   Substance Use Topics    Alcohol use: Never    Drug use: Never            Objective:  Vital Signs: Blood pressure 136/74, pulse 78, height 5' 7\" (1.702 m), SpO2 98 %. Labs:  BMP: No results for input(s): NA, K, CL, CO2, PHOS, BUN, CREATININE, CALCIUM in the last 72 hours. CBC:   No results for input(s): WBC, HGB, HCT, MCV, PLT in the last 72 hours.     LIVER PROFILE: No results for input(s): AST, ALT, LIPASE, BILIDIR, BILITOT, ALKPHOS in the last 72 hours. Invalid input(s): AMYLASE,  ALB  PT/INR: No results for input(s): PROTIME, INR in the last 72 hours. APTT: No results for input(s): APTT in the last 72 hours. BNP:  No results for input(s): BNP in the last 72 hours. Ionized Calcium:No results for input(s): IONCA in the last 72 hours. Magnesium:No results for input(s): MG in the last 72 hours. Phosphorus:No results for input(s): PHOS in the last 72 hours. HgbA1C: No results for input(s): LABA1C in the last 72 hours. Hepatic: No results for input(s): ALKPHOS, ALT, AST, PROT, BILITOT, BILIDIR, LABALBU in the last 72 hours. Lactic Acid: No results for input(s): LACTA in the last 72 hours. Troponin: No results for input(s): CKTOTAL, CKMB, TROPONINT in the last 72 hours. ABGs: No results for input(s): PH, PCO2, PO2, HCO3, O2SAT in the last 72 hours. CRP:  No results for input(s): CRP in the last 72 hours. Sed Rate:  No results for input(s): SEDRATE in the last 72 hours. Cultures:   No results for input(s): CULTURE in the last 72 hours. Radiology reports as per the Radiologist  Radiology: No results found. ASSESSMENT AND PLAN:    Teresa Varghese is a pleasant 75-year-old  female managed with primary and secondary diagnoses as outlined:  Left lumpectomy for a Stage I (T1 N0 M0) infiltrating breast cancer, 12/19/2006  Lazara quit smoking on 10/1/2018. 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 She had COVID-19 in ~January 2021, still with fatigue symptoms.      #1  Left lumpectomy for a Stage I (T1 N0 M0) infiltrating breast cancer, 12/19/2006    /74   Pulse 78   Ht 5' 7\" (1.702 m)   SpO2 98%   BMI 23.18 kg/m²      Physical examination today, 10/27/2022:  Bilateral breast examination is performed and without new findings. The left nipple is chronically inverted and fibrocystic disease to the inner medial right breast adjacent to the nipple is unchanged. There are no cervical, supra/infraclavicular or axillary adenopathies. The abdomen is benign. Lungs are clear. CBC today 10/20/2022  reveals a WBC of 5.83 Hgb is 13.3 with an MCV of 98.4 and platelet count of 928,105. Serology on 2/27/2020 revealed:  CMP- WNL  CEA- 0.6  CA 15.3- 24.3        Tumor markers with  CA 15-3 and CEA were both normal last week. Bilateral mammograms on 10/05/2022 at [facility] was a BI-RADS Category 1 with a one-year follow-up recommended. I will see Bogdan Geiger back in follow-up in 1 year with lab work prior to her return. #2  Breast cancer screening    Bilateral mammograms on 10/05/2022 at [facility] was a BI-RADS Category 1 with a one-year follow-up recommended. Bilateral breast examination today, is performed and without new findings    #3  GI cancer screening    Colonoscopy performed on 5/28/2019 by Dr. Lily Pedraza at [facility] showed moderate diverticulosis throughout the descending and sigmoid colons, otherwise normal.   Pathology negative. #4  GYN cancer screening    Ms. Barney will be seeing MAGN[facility] Nixon in [facility] as PCP and GYN monitoring. She admits it has been a while since she had a Pap smear. She will get this taken care of. #5  Lung cancer screening    Previous smoker. Lazara quit smoking on 10/1/2018. CT chest on 7/22/2019 documented:   No evidence of hilar or mediastinal adenopathy. Mild chronic interstitial changes with no areas of acute consolidation  No pleural effusions nor discrete parenchymal nodules        Plans:  1. Conservative monitoring will continue. Follow-up 6 months, sooner if needed with lab work. Sahra Rico was seen today for follow-up.     Diagnoses

## 2022-10-27 ENCOUNTER — HOSPITAL ENCOUNTER (OUTPATIENT)
Dept: INFUSION THERAPY | Age: 72
Discharge: HOME | End: 2022-10-27
Payer: COMMERCIAL

## 2022-10-27 ENCOUNTER — OFFICE VISIT (OUTPATIENT)
Dept: HEMATOLOGY | Age: 72
End: 2022-10-27
Payer: COMMERCIAL

## 2022-10-27 VITALS
BODY MASS INDEX: 23.18 KG/M2 | SYSTOLIC BLOOD PRESSURE: 136 MMHG | OXYGEN SATURATION: 98 % | DIASTOLIC BLOOD PRESSURE: 74 MMHG | HEART RATE: 78 BPM | HEIGHT: 67 IN

## 2022-10-27 DIAGNOSIS — Z12.31 ENCOUNTER FOR SCREENING MAMMOGRAM FOR MALIGNANT NEOPLASM OF BREAST: ICD-10-CM

## 2022-10-27 DIAGNOSIS — Z85.3 PERSONAL HISTORY OF MALIGNANT PHYLLOIDES TUMOR OF BREAST: Primary | ICD-10-CM

## 2022-10-27 PROCEDURE — 1090F PRES/ABSN URINE INCON ASSESS: CPT | Performed by: INTERNAL MEDICINE

## 2022-10-27 PROCEDURE — 99214 OFFICE O/P EST MOD 30 MIN: CPT | Performed by: INTERNAL MEDICINE

## 2022-10-27 PROCEDURE — 3017F COLORECTAL CA SCREEN DOC REV: CPT | Performed by: INTERNAL MEDICINE

## 2022-10-27 PROCEDURE — G8420 CALC BMI NORM PARAMETERS: HCPCS | Performed by: INTERNAL MEDICINE

## 2022-10-27 PROCEDURE — G8427 DOCREV CUR MEDS BY ELIG CLIN: HCPCS | Performed by: INTERNAL MEDICINE

## 2022-10-27 PROCEDURE — G8484 FLU IMMUNIZE NO ADMIN: HCPCS | Performed by: INTERNAL MEDICINE

## 2022-10-27 PROCEDURE — 1036F TOBACCO NON-USER: CPT | Performed by: INTERNAL MEDICINE

## 2022-10-27 PROCEDURE — 1123F ACP DISCUSS/DSCN MKR DOCD: CPT | Performed by: INTERNAL MEDICINE

## 2022-10-27 PROCEDURE — 99212 OFFICE O/P EST SF 10 MIN: CPT

## 2022-10-27 PROCEDURE — G8400 PT W/DXA NO RESULTS DOC: HCPCS | Performed by: INTERNAL MEDICINE

## 2023-10-19 ENCOUNTER — HOSPITAL ENCOUNTER (OUTPATIENT)
Dept: INFUSION THERAPY | Age: 73
Discharge: HOME | End: 2023-10-19
Payer: COMMERCIAL

## 2023-10-19 DIAGNOSIS — Z85.3 PERSONAL HISTORY OF MALIGNANT PHYLLOIDES TUMOR OF BREAST: Primary | ICD-10-CM

## 2023-10-19 DIAGNOSIS — Z85.3 PERSONAL HISTORY OF MALIGNANT PHYLLOIDES TUMOR OF BREAST: ICD-10-CM

## 2023-10-19 LAB
ALBUMIN SERPL-MCNC: 4.6 G/DL (ref 3.5–5.2)
ALP SERPL-CCNC: 79 U/L (ref 35–104)
ALT SERPL-CCNC: 28 U/L (ref 9–52)
ANION GAP SERPL CALCULATED.3IONS-SCNC: 9 MMOL/L (ref 7–19)
AST SERPL-CCNC: 34 U/L (ref 14–36)
BASOPHILS # BLD: 0.04 K/UL (ref 0.01–0.08)
BASOPHILS NFR BLD: 0.5 % (ref 0.1–1.2)
BILIRUB SERPL-MCNC: 0.7 MG/DL (ref 0.2–1.3)
BUN SERPL-MCNC: 27 MG/DL (ref 7–17)
CALCIUM SERPL-MCNC: 9.8 MG/DL (ref 8.4–10.2)
CHLORIDE SERPL-SCNC: 106 MMOL/L (ref 98–111)
CO2 SERPL-SCNC: 30 MMOL/L (ref 22–29)
CREAT SERPL-MCNC: 0.7 MG/DL (ref 0.5–1)
EOSINOPHIL # BLD: 0.22 K/UL (ref 0.04–0.54)
EOSINOPHIL NFR BLD: 3 % (ref 0.7–7)
ERYTHROCYTE [DISTWIDTH] IN BLOOD BY AUTOMATED COUNT: 13.2 % (ref 11.7–14.4)
GLOBULIN: 3.1 G/DL
GLUCOSE SERPL-MCNC: 84 MG/DL (ref 74–106)
HCT VFR BLD AUTO: 43.4 % (ref 34.1–44.9)
HGB BLD-MCNC: 13.8 G/DL (ref 11.2–15.7)
LYMPHOCYTES # BLD: 1.65 K/UL (ref 1.18–3.74)
LYMPHOCYTES NFR BLD: 22.3 % (ref 19.3–53.1)
MCH RBC QN AUTO: 30.1 PG (ref 25.6–32.2)
MCHC RBC AUTO-ENTMCNC: 31.8 G/DL (ref 32.3–35.5)
MCV RBC AUTO: 94.6 FL (ref 79.4–94.8)
MONOCYTES # BLD: 0.39 K/UL (ref 0.24–0.82)
MONOCYTES NFR BLD: 5.3 % (ref 4.7–12.5)
NEUTROPHILS # BLD: 5.07 K/UL (ref 1.56–6.13)
NEUTS SEG NFR BLD: 68.6 % (ref 34–71.1)
PLATELET # BLD AUTO: 298 K/UL (ref 182–369)
PMV BLD AUTO: 9.2 FL (ref 7.4–10.4)
POTASSIUM SERPL-SCNC: 4.3 MMOL/L (ref 3.5–5.1)
PROT SERPL-MCNC: 7.7 G/DL (ref 6.3–8.2)
RBC # BLD AUTO: 4.59 M/UL (ref 3.93–5.22)
SODIUM SERPL-SCNC: 145 MMOL/L (ref 137–145)
WBC # BLD AUTO: 7.39 K/UL (ref 3.98–10.04)

## 2023-10-19 PROCEDURE — 36415 COLL VENOUS BLD VENIPUNCTURE: CPT

## 2023-10-19 PROCEDURE — 80053 COMPREHEN METABOLIC PANEL: CPT

## 2023-10-19 PROCEDURE — 85025 COMPLETE CBC W/AUTO DIFF WBC: CPT

## 2023-10-24 NOTE — PROGRESS NOTES
in about 1 year (around 10/26/2024) for Follow up with Dr. Primo hilario prior to return (CBC CMP CEA CA 15-3).

## 2023-10-25 ENCOUNTER — TELEPHONE (OUTPATIENT)
Dept: HEMATOLOGY | Age: 73
End: 2023-10-25

## 2023-10-26 ENCOUNTER — HOSPITAL ENCOUNTER (OUTPATIENT)
Dept: INFUSION THERAPY | Age: 73
Discharge: HOME | End: 2023-10-26
Payer: COMMERCIAL

## 2023-10-26 ENCOUNTER — OFFICE VISIT (OUTPATIENT)
Dept: HEMATOLOGY | Age: 73
End: 2023-10-26
Payer: COMMERCIAL

## 2023-10-26 VITALS
OXYGEN SATURATION: 98 % | WEIGHT: 137.1 LBS | BODY MASS INDEX: 21.52 KG/M2 | DIASTOLIC BLOOD PRESSURE: 68 MMHG | HEART RATE: 74 BPM | HEIGHT: 67 IN | SYSTOLIC BLOOD PRESSURE: 118 MMHG

## 2023-10-26 DIAGNOSIS — Z12.31 ENCOUNTER FOR SCREENING MAMMOGRAM FOR MALIGNANT NEOPLASM OF BREAST: Primary | ICD-10-CM

## 2023-10-26 DIAGNOSIS — Z00.00 HEALTH CARE MAINTENANCE: ICD-10-CM

## 2023-10-26 DIAGNOSIS — Z85.3 PERSONAL HISTORY OF MALIGNANT PHYLLOIDES TUMOR OF BREAST: ICD-10-CM

## 2023-10-26 PROCEDURE — G8427 DOCREV CUR MEDS BY ELIG CLIN: HCPCS | Performed by: INTERNAL MEDICINE

## 2023-10-26 PROCEDURE — 1090F PRES/ABSN URINE INCON ASSESS: CPT | Performed by: INTERNAL MEDICINE

## 2023-10-26 PROCEDURE — 3017F COLORECTAL CA SCREEN DOC REV: CPT | Performed by: INTERNAL MEDICINE

## 2023-10-26 PROCEDURE — G8484 FLU IMMUNIZE NO ADMIN: HCPCS | Performed by: INTERNAL MEDICINE

## 2023-10-26 PROCEDURE — 99212 OFFICE O/P EST SF 10 MIN: CPT

## 2023-10-26 PROCEDURE — 1036F TOBACCO NON-USER: CPT | Performed by: INTERNAL MEDICINE

## 2023-10-26 PROCEDURE — 99214 OFFICE O/P EST MOD 30 MIN: CPT | Performed by: INTERNAL MEDICINE

## 2023-10-26 PROCEDURE — G8400 PT W/DXA NO RESULTS DOC: HCPCS | Performed by: INTERNAL MEDICINE

## 2023-10-26 PROCEDURE — 1123F ACP DISCUSS/DSCN MKR DOCD: CPT | Performed by: INTERNAL MEDICINE

## 2023-10-26 PROCEDURE — G8420 CALC BMI NORM PARAMETERS: HCPCS | Performed by: INTERNAL MEDICINE

## 2023-10-26 RX ORDER — MELOXICAM 15 MG/1
TABLET ORAL
COMMUNITY
Start: 2023-10-24

## 2023-10-26 RX ORDER — PREDNISONE 20 MG/1
TABLET ORAL
COMMUNITY
Start: 2023-10-23

## 2024-10-28 DIAGNOSIS — Z12.31 ENCOUNTER FOR SCREENING MAMMOGRAM FOR MALIGNANT NEOPLASM OF BREAST: Primary | ICD-10-CM

## 2024-10-28 DIAGNOSIS — Z85.3 PERSONAL HISTORY OF MALIGNANT PHYLLOIDES TUMOR OF BREAST: ICD-10-CM

## 2024-10-28 NOTE — PROGRESS NOTES
PROGRESS NOTE  Patient:  Lazara Barney  YOB: 1950  Date of Service: 10/31/2024  MRN: [id number]    Primary Care Physician: Oleg Blair, [facility] - [place]    Chief Complaint   Patient presents with    Follow-up    Cancer     Left breast cancer 12/2006.        Patient Seen, Chart, Consults notes, Labs, Radiology studies reviewed.    Subjective:    Lazara is a pleasant 74-year-old  female managed with primary and secondary diagnoses as outlined:  Left lumpectomy for a Stage I (T1 N0 M0), ER positive, MN negative, oef1dcs 3+ positive, infiltrating breast cancer, 12/19/2006  Lazara quit smoking on 10/1/2018.  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  She tells me that she is given notice that she will be retiring by the end of the year 2023.    She had COVID-19 in ~January 2021, still with fatigue symptoms.      Lazara presents today, 10/31/2024 for continued monitoring without new complaints with regard to her previous history of left breast cancer.    TARGET BREAST CANCER SITES:  Left lumpectomy 12/19/2006.     TUMOR HISTORY: Left stage I (T1 N0 M0) ER positive, MN negative, csn2lsi 3+ positive,  infiltrating breast cancer, 12/19/2006    On 12/19/2006 Lazara underwent a left lumpectomy and axillary dissection.  Pathology revealed a 2cm grade III, ER positive, MN negative, yjh1ucs 3+ positive breast cancer.   The axillary lymph node dissection was negative.      She received 4 courses of adjuvant Adriamycin/Cytoxan followed by 4 courses of Taxotere.  Lazara also received adjuvant Herceptin that was completed on 04/21/08.      Adjuvant radiation therapy to the left breast consisted of 5,040cGy followed by a 1,000cGy boost

## 2024-10-29 ENCOUNTER — TELEPHONE (OUTPATIENT)
Dept: HEMATOLOGY | Age: 74
End: 2024-10-29

## 2024-10-29 NOTE — TELEPHONE ENCOUNTER
I called patient and reminded patient of their appt on 10/31/24 and patient confirmed they would be here. I also let patient know that we have moved into our new cancer facility and asked patient if they were aware of where we were now located, and patient voiced understanding of our new location. Patient knows not to arrive early and that we will get labs at the time of the follow up appointment and not the lab appointment time. AM

## 2024-10-31 ENCOUNTER — OFFICE VISIT (OUTPATIENT)
Dept: HEMATOLOGY | Age: 74
End: 2024-10-31

## 2024-10-31 ENCOUNTER — HOSPITAL ENCOUNTER (OUTPATIENT)
Dept: INFUSION THERAPY | Age: 74
Discharge: HOME | End: 2024-10-31
Payer: MEDICARE

## 2024-10-31 VITALS
OXYGEN SATURATION: 99 % | WEIGHT: 143.7 LBS | SYSTOLIC BLOOD PRESSURE: 118 MMHG | HEIGHT: 67 IN | BODY MASS INDEX: 22.55 KG/M2 | HEART RATE: 76 BPM | DIASTOLIC BLOOD PRESSURE: 68 MMHG | TEMPERATURE: 97.9 F

## 2024-10-31 DIAGNOSIS — Z85.3 PERSONAL HISTORY OF MALIGNANT PHYLLOIDES TUMOR OF BREAST: ICD-10-CM

## 2024-10-31 DIAGNOSIS — Z00.00 HEALTH CARE MAINTENANCE: ICD-10-CM

## 2024-10-31 DIAGNOSIS — Z85.3 PERSONAL HISTORY OF MALIGNANT PHYLLOIDES TUMOR OF BREAST: Primary | ICD-10-CM

## 2024-10-31 DIAGNOSIS — Z12.31 ENCOUNTER FOR SCREENING MAMMOGRAM FOR MALIGNANT NEOPLASM OF BREAST: ICD-10-CM

## 2024-10-31 DIAGNOSIS — N64.9 BREAST LESION: ICD-10-CM

## 2024-10-31 LAB
ALBUMIN SERPL-MCNC: 4.5 G/DL (ref 3.5–5.2)
ALP SERPL-CCNC: 73 U/L (ref 35–104)
ALT SERPL-CCNC: 16 U/L (ref 5–33)
ANION GAP SERPL CALCULATED.3IONS-SCNC: 9 MMOL/L (ref 7–19)
AST SERPL-CCNC: 27 U/L (ref 5–32)
BASOPHILS # BLD: 0.07 K/UL (ref 0.01–0.08)
BASOPHILS NFR BLD: 1.1 % (ref 0.1–1.2)
BILIRUB SERPL-MCNC: 0.6 MG/DL (ref 0–1.2)
BUN SERPL-MCNC: 26 MG/DL (ref 8–23)
CA 15-3: 23 U/ML (ref 0–25)
CALCIUM SERPL-MCNC: 9 MG/DL (ref 8.8–10.2)
CEA SERPL-MCNC: 1.8 NG/ML (ref 0–4.7)
CHLORIDE SERPL-SCNC: 105 MMOL/L (ref 98–107)
CO2 SERPL-SCNC: 27 MMOL/L (ref 22–29)
CREAT SERPL-MCNC: 0.9 MG/DL (ref 0.5–0.9)
EOSINOPHIL # BLD: 0.13 K/UL (ref 0.04–0.54)
EOSINOPHIL NFR BLD: 2.1 % (ref 0.7–7)
ERYTHROCYTE [DISTWIDTH] IN BLOOD BY AUTOMATED COUNT: 13.1 % (ref 11.7–14.4)
GLUCOSE SERPL-MCNC: 100 MG/DL (ref 70–99)
HCT VFR BLD AUTO: 43.3 % (ref 34.1–44.9)
HGB BLD-MCNC: 14.1 G/DL (ref 11.2–15.7)
LYMPHOCYTES # BLD: 1.91 K/UL (ref 1.18–3.74)
LYMPHOCYTES NFR BLD: 30.6 % (ref 19.3–53.1)
MCH RBC QN AUTO: 30.7 PG (ref 25.6–32.2)
MCHC RBC AUTO-ENTMCNC: 32.6 G/DL (ref 32.3–35.5)
MCV RBC AUTO: 94.1 FL (ref 79.4–94.8)
MONOCYTES # BLD: 0.39 K/UL (ref 0.24–0.82)
MONOCYTES NFR BLD: 6.3 % (ref 4.7–12.5)
NEUTROPHILS # BLD: 3.73 K/UL (ref 1.56–6.13)
NEUTS SEG NFR BLD: 59.7 % (ref 34–71.1)
PLATELET # BLD AUTO: 354 K/UL (ref 182–369)
PMV BLD AUTO: 9.1 FL (ref 7.4–10.4)
POTASSIUM SERPL-SCNC: 4.4 MMOL/L (ref 3.5–5.1)
PROT SERPL-MCNC: 7 G/DL (ref 6.4–8.3)
RBC # BLD AUTO: 4.6 M/UL (ref 3.93–5.22)
SODIUM SERPL-SCNC: 141 MMOL/L (ref 136–145)
WBC # BLD AUTO: 6.24 K/UL (ref 3.98–10.04)

## 2024-10-31 PROCEDURE — 99213 OFFICE O/P EST LOW 20 MIN: CPT

## 2024-10-31 PROCEDURE — 85025 COMPLETE CBC W/AUTO DIFF WBC: CPT

## 2024-10-31 PROCEDURE — 80053 COMPREHEN METABOLIC PANEL: CPT

## 2024-10-31 PROCEDURE — 36415 COLL VENOUS BLD VENIPUNCTURE: CPT

## 2024-10-31 RX ORDER — CHLORAL HYDRATE 500 MG
1 CAPSULE ORAL DAILY
COMMUNITY

## 2024-11-12 PROCEDURE — 88305 TISSUE EXAM BY PATHOLOGIST: CPT | Performed by: PHYSICIAN ASSISTANT

## 2024-11-15 ENCOUNTER — LAB REQUISITION (OUTPATIENT)
Dept: LAB | Facility: HOSPITAL | Age: 74
End: 2024-11-15
Payer: MEDICARE

## 2024-11-15 DIAGNOSIS — D49.2 NEOPLASM OF UNSPECIFIED BEHAVIOR OF BONE, SOFT TISSUE, AND SKIN: ICD-10-CM

## 2024-11-22 LAB
CYTO UR: NORMAL
LAB AP CASE REPORT: NORMAL
LAB AP CLINICAL INFORMATION: NORMAL
Lab: NORMAL
PATH REPORT.FINAL DX SPEC: NORMAL
PATH REPORT.GROSS SPEC: NORMAL